# Patient Record
Sex: FEMALE | Race: OTHER | Employment: FULL TIME | URBAN - METROPOLITAN AREA
[De-identification: names, ages, dates, MRNs, and addresses within clinical notes are randomized per-mention and may not be internally consistent; named-entity substitution may affect disease eponyms.]

---

## 2019-12-18 LAB
CHLAMYDIA, EXTERNAL: NEGATIVE
HBSAG, EXTERNAL: NEGATIVE
HIV, EXTERNAL: NON REACTIVE
N. GONORRHEA, EXTERNAL: NEGATIVE
RUBELLA, EXTERNAL: NORMAL
T. PALLIDUM, EXTERNAL: NON REACTIVE
TYPE, ABO & RH, EXTERNAL: NORMAL

## 2020-05-04 LAB
ANTIBODY SCREEN, EXTERNAL: NEGATIVE
T. PALLIDUM, EXTERNAL: NON REACTIVE

## 2020-06-29 LAB — GRBS, EXTERNAL: NEGATIVE

## 2020-07-14 ENCOUNTER — HOSPITAL ENCOUNTER (OUTPATIENT)
Dept: PREADMISSION TESTING | Age: 25
Discharge: HOME | End: 2020-07-14
Payer: COMMERCIAL

## 2020-07-14 DIAGNOSIS — U07.1 COVID-19: ICD-10-CM

## 2020-07-14 PROCEDURE — 87635 SARS-COV-2 COVID-19 AMP PRB: CPT

## 2020-07-15 LAB — SARS-COV-2, COV2NT: NOT DETECTED

## 2020-07-16 ENCOUNTER — HOSPITAL ENCOUNTER (INPATIENT)
Age: 25
LOS: 5 days | Discharge: HOME | End: 2020-07-21
Attending: OBSTETRICS & GYNECOLOGY | Admitting: OBSTETRICS & GYNECOLOGY
Payer: COMMERCIAL

## 2020-07-16 DIAGNOSIS — Z98.891 S/P CESAREAN SECTION: Primary | ICD-10-CM

## 2020-07-16 PROBLEM — O36.5990 IUGR (INTRAUTERINE GROWTH RESTRICTION) AFFECTING CARE OF MOTHER: Status: ACTIVE | Noted: 2020-07-16

## 2020-07-16 LAB
ALBUMIN SERPL-MCNC: 2.6 G/DL (ref 3.5–5)
ALBUMIN/GLOB SERPL: 0.7 {RATIO} (ref 1.1–2.2)
ALP SERPL-CCNC: 136 U/L (ref 45–117)
ALT SERPL-CCNC: 21 U/L (ref 12–78)
ANION GAP SERPL CALC-SCNC: 10 MMOL/L (ref 5–15)
AST SERPL-CCNC: 22 U/L (ref 15–37)
BILIRUB SERPL-MCNC: 0.3 MG/DL (ref 0.2–1)
BUN SERPL-MCNC: 13 MG/DL (ref 6–20)
BUN/CREAT SERPL: 19 (ref 12–20)
CALCIUM SERPL-MCNC: 8.3 MG/DL (ref 8.5–10.1)
CHLORIDE SERPL-SCNC: 106 MMOL/L (ref 97–108)
CO2 SERPL-SCNC: 20 MMOL/L (ref 21–32)
CREAT SERPL-MCNC: 0.67 MG/DL (ref 0.55–1.02)
CREAT UR-MCNC: 144 MG/DL
ERYTHROCYTE [DISTWIDTH] IN BLOOD BY AUTOMATED COUNT: 13.4 % (ref 11.5–14.5)
GLOBULIN SER CALC-MCNC: 3.6 G/DL (ref 2–4)
GLUCOSE SERPL-MCNC: 142 MG/DL (ref 65–100)
HCT VFR BLD AUTO: 33.6 % (ref 35–47)
HGB BLD-MCNC: 11.2 G/DL (ref 11.5–16)
MCH RBC QN AUTO: 29.3 PG (ref 26–34)
MCHC RBC AUTO-ENTMCNC: 33.3 G/DL (ref 30–36.5)
MCV RBC AUTO: 88 FL (ref 80–99)
NRBC # BLD: 0 K/UL (ref 0–0.01)
NRBC BLD-RTO: 0 PER 100 WBC
PLATELET # BLD AUTO: 315 K/UL (ref 150–400)
PMV BLD AUTO: 10.1 FL (ref 8.9–12.9)
POTASSIUM SERPL-SCNC: 3.6 MMOL/L (ref 3.5–5.1)
PROT SERPL-MCNC: 6.2 G/DL (ref 6.4–8.2)
PROT UR-MCNC: 19 MG/DL (ref 0–11.9)
PROT/CREAT UR-RTO: 0.1
RBC # BLD AUTO: 3.82 M/UL (ref 3.8–5.2)
SODIUM SERPL-SCNC: 136 MMOL/L (ref 136–145)
WBC # BLD AUTO: 11.9 K/UL (ref 3.6–11)

## 2020-07-16 PROCEDURE — 77030028565 HC CATH CERV RIPNG BLN COOK -B

## 2020-07-16 PROCEDURE — 85027 COMPLETE CBC AUTOMATED: CPT

## 2020-07-16 PROCEDURE — 84156 ASSAY OF PROTEIN URINE: CPT

## 2020-07-16 PROCEDURE — 65270000029 HC RM PRIVATE

## 2020-07-16 PROCEDURE — 80053 COMPREHEN METABOLIC PANEL: CPT

## 2020-07-16 PROCEDURE — 4A1HXCZ MONITORING OF PRODUCTS OF CONCEPTION, CARDIAC RATE, EXTERNAL APPROACH: ICD-10-PCS | Performed by: OBSTETRICS & GYNECOLOGY

## 2020-07-16 PROCEDURE — 36415 COLL VENOUS BLD VENIPUNCTURE: CPT

## 2020-07-16 RX ORDER — SODIUM CHLORIDE 0.9 % (FLUSH) 0.9 %
5-40 SYRINGE (ML) INJECTION EVERY 8 HOURS
Status: DISCONTINUED | OUTPATIENT
Start: 2020-07-16 | End: 2020-07-21 | Stop reason: HOSPADM

## 2020-07-16 RX ORDER — OXYTOCIN/0.9 % SODIUM CHLORIDE 30/500 ML
0-25 PLASTIC BAG, INJECTION (ML) INTRAVENOUS
Status: DISCONTINUED | OUTPATIENT
Start: 2020-07-17 | End: 2020-07-18 | Stop reason: HOSPADM

## 2020-07-16 RX ORDER — ZOLPIDEM TARTRATE 5 MG/1
5 TABLET ORAL
Status: DISCONTINUED | OUTPATIENT
Start: 2020-07-16 | End: 2020-07-21 | Stop reason: HOSPADM

## 2020-07-16 RX ORDER — CYANOCOBALAMIN (VITAMIN B-12) 1000 MCG
1 TABLET, EXTENDED RELEASE ORAL DAILY
COMMUNITY

## 2020-07-16 RX ORDER — ONDANSETRON 2 MG/ML
4 INJECTION INTRAMUSCULAR; INTRAVENOUS
Status: DISCONTINUED | OUTPATIENT
Start: 2020-07-16 | End: 2020-07-18 | Stop reason: HOSPADM

## 2020-07-16 RX ORDER — SODIUM CHLORIDE 0.9 % (FLUSH) 0.9 %
5-40 SYRINGE (ML) INJECTION AS NEEDED
Status: DISCONTINUED | OUTPATIENT
Start: 2020-07-16 | End: 2020-07-21 | Stop reason: HOSPADM

## 2020-07-16 RX ORDER — SODIUM CHLORIDE 0.9 % (FLUSH) 0.9 %
SYRINGE (ML) INJECTION
Status: COMPLETED
Start: 2020-07-16 | End: 2020-07-16

## 2020-07-16 RX ORDER — FENTANYL CITRATE 50 UG/ML
50 INJECTION, SOLUTION INTRAMUSCULAR; INTRAVENOUS
Status: COMPLETED | OUTPATIENT
Start: 2020-07-16 | End: 2020-07-18

## 2020-07-16 RX ORDER — ASPIRIN 81 MG/1
81 TABLET ORAL DAILY
COMMUNITY
End: 2020-07-21

## 2020-07-16 RX ORDER — TERBUTALINE SULFATE 1 MG/ML
0.25 INJECTION SUBCUTANEOUS AS NEEDED
Status: DISCONTINUED | OUTPATIENT
Start: 2020-07-16 | End: 2020-07-18 | Stop reason: HOSPADM

## 2020-07-16 RX ADMIN — Medication 10 ML: at 16:51

## 2020-07-16 NOTE — PROGRESS NOTES
A  admitted to [facility] 7 for induction under the services of Dr. Mario Cummins, Dr. Vikas Dolan covering. Pt states she is being induced because the baby is small (IUGR). Denies other problems during the pregnancy. 1651:  IV started in L hand, labs drawn with IV start. 1707:  Dr. Vikas Dolan at bedside discussing plan of care. 1712: Attempted placement of Cook catheter per Dr. Vikas Dolan. 1728:  Cook Catheter placed per Dr. Vikas Dolan, 60 ml NS in uterine balloon and 60 ml NS in vaginal balloon  1844:  Pt sitting up eating dinner, difficulty keeping continuous FHR tracing. 1945:  Bedside and Verbal shift change report given to LESTER Stokes RN (oncoming nurse) by VINCE Mulligan RN (offgoing nurse). Report included the following information SBAR, Kardex, Intake/Output, MAR and Recent Results.

## 2020-07-16 NOTE — H&P
Print      Patient  Name Skylar Storey (75BS, F) ID# [id number] Appt. Date/Time 2020 09:45AM    1995 Service Dept. [facility] [facility]   Provider Keila Cardona MD   Insurance Med Primary: [facility] [employer] [facility] [address] # : [id number]  Policy/Group # : [id number]  Employer Name : [employer]   Prescription: [employer] - Member is eligible. details   Chief Complaint  OB visit  Patient's Care Team  OB/GYN: Justine García [address], [facility], [address], Ph [phone], Fax [phone] NPI: [id number]  Primary Care Provider: Kleber Campbell (NO PREFERRED PCP): [phone]  Patient's Pharmacies  [facility] #[id number]): [address], [place] [address], Ph [phone], Fax [phone]  2020 09:57 am  Wt: 241 lbs (109.32 kg) BP: 136/90     Allergies  Allergies not reviewed (last reviewed 2020)     NKDA   Medications  Reviewed Medications     Asprin Ec Low Dose 81 mg tablet,delayed release  Take 1 tablet(s) every day by oral route. 02/10/20   entered Tessy Stokes NP   Prenatal 20   entered Carmel Bush Stevens   Slow Fe 20   entered Viktoria Watkins NP   Vaccines  Vaccine Type Date Amt. Route Site [address] Lot # Mfr. Exp.   Date Date  on VIS VIS  Given Vaccinator   Diphtheria, Tetanus, Pertussis   Tdap 20 0.5 mL Intramuscular Deltoid, Left  [id number] [employer]  20 Sonia Cadet                                                     HPV   HPV, quadrivalent 17                                                               Problems  Reviewed Problems     Obesity - Onset: 2017 - Entered By: Trudy Headley LPN - Team 3 SHPSigned By: Kb Javier CNM Description: Obesity (BMI > 29.99) code: 278.00   Hidradenitis suppurativa - Onset: 2019  Family History  Family History not reviewed (last reviewed 2020)    Maternal Grandmother - Diabetes mellitus   Paternal Grandfather - Family history of cancer  - unknown type   Maternal Grandfather - Diabetes mellitus   Social History  Social History not reviewed (last reviewed 05/04/2020)  OB/GYN Social History  Chewing tobacco: none  Tobacco Smoking Status: Former smoker  E-cigarette/Vape Status: Never used electronic cigarettes  Marital status: Single  Sexual orientation?: Straight or heterosexual  Number of children: 0  Are you working: Yes  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)  On average, how many days per week do you engage in moderate to strenuous EXERCISE (like walking fast, running, jogging, dancing, swimming, biking, or other activities that cause a light or heavy sweat)?: 0  On those days, how many minutes, on average, do you engage in EXERCISE at this level?: 0  How often do you have a DRINK containing ALCOHOL?: Never  Have you recently (within the last 12 weeks, or during a current pregnancy) traveled to or lived in a Zika-affected area?: N  Do you have symptoms associated with Zika virus (fever, rash, joint pain, or conjunctivitis)?: N  Are you currently sexually active with anyone who has traveled (within the last 12 weeks) to a Zika-affected area?: N  Are you planning to conceive with someone who has traveled (within the last 12 weeks) to a Zika-affected area?: N  Have you had sexual relations with anyone who has been positively diagnosed with Zika virus within the last 6 months?: N  Is blood transfusion acceptable in an emergency?: Y  Surgical History  Surgical History not reviewed (last reviewed 05/04/2020)    [facility] - 2017 - right ankle  GYN History  GYN History not reviewed (last reviewed 12/18/2019)  Date of LMP: 10/25/2019 (Notes: Pregnant). Menses Monthly: N. Date of Last Pap Smear: 11/17/2017 (Notes: NIL). Abnormal Pap: N.  HPV Vaccine: Y (Notes: Completed). Sexual Orientation: Heterosexual.  Sexually Active?: Y.  STIs/STDs: N.  Current Birth Control Method: Pregnant. Endometriosis: N.   Fibroids: N.  Infertility: N. Ovarian Cyst: N. PCOS: N.  Obstetric History  Obstetric History not reviewed (last reviewed 12/18/2019)    TOTAL FULL PRE AB. I AB. S ECTOPICS MULTIPLE LIVING   1          Pregnancy Problem List   Anemia - hgb 11   Primigravida   Small for gestational age fetus - FS 12%ile Repeat growth 24w__16th% 34wUS __11%ile (AC 12%ile, normal dopplers) Repeat US w/MFM 37w _8%ile--->IOL at 37-38w for IUGR_   Maternal obesity complicating pregnancy, childbirth and the puerperium, antepartum - 81mg Asprin ~12wks:_taking    gender reveal 03/21- it's a GIRL! cervical ripening 7/16/20 @ 4pm @ [address] [place] [address] w/ IOL 7/17/20 7/14/20 PNR faxed - [facility]  Genetic Screening and Infection History  Medications (including Supplements, Vitamins, Herbs, OTC Drugs), Illicit/Recreational Drugs, Alcohol: Y, Alcohol and marijuana use prior to pregnancy knowledge. .  Patient's Age Will Be 28 Years Or Older At Estimated Date of Delivery: N  2824: [facility] ([facility] [place], [place], Or  Background): MCV < 80: N  Neural Tube Defect (Meningomyelocele, Spina Bifida, Or Anencephaly): N  746: Congenital Heart Defect: N  7580: Down Syndrome: N  Edmundo-Sachs (eg, [facility], [facility]): N  Canavan Disease: N  282: Sickle Cell Disease Or Trait (): N  Hemophilia Or Other Blood Disorders: N  359: Muscular Dystrophy: N  2770: Cystic Fibrosis: N  3334: Marito's Chorea: N  319: Mental Retardation/Autism: N  If Yes, Was Person Tested For Fragile X?: N  Other Inherited Genetic Or Chromosomal Disorder: N  Maternal Metabolic Disorder (eg, Type 1 Diabetes, PKU): N  Patient Or [de-identified] Father Had A Child With Birth Defects Not Listed Above: N  Recurrent Pregnancy Loss, Or A Stillbirth: N  If Yes, Agent(s) And Strength/Dosage: N  Any Other Genetic History: N  Live With Someone With TB Or Exposed To TB: N  Patient Or Partner Has History Of Genital Herpes: N  Rash Or Viral Illness Since Last Menstrual Period: N  History Of STD, Gonorrhea, Chlamydia, HPV, Syphilis: N  Other Infection History: N  History of HIV: N  History of Hepatitis: N  Prior GBS-infected child: N  Prenatal Flowsheet  Fundus Pres FHR FM PLS Cervix Exam BP Wt Edema Glucose Protein Leukocytes Nitrite Ketones Blood   12/18/2019   8 wks 4 days                                          12/18/2019   8 wks 4 days   [email or web address]                         175    118/72 sitting 177 lbs none none trace       Comments: Viable IUP, Prenatal profile sent. Discussed PN screening/ testing options, diet, medications and full PN packet reviewed. Discussed \"Deck model\" and routine visit schedule. General Rec's and precautions reviewed. All q's answered. 01/16/2020   12 wks 5 days   [email or web address]                         155    122/80 182 lbs none none trace       Comments: Doing well. Minimal nausea and vomiting. Declined genetic screening tests. No V/B or LOF. No pelvic pain. Reviewed ASA during pregnancy, patient to do research. All questions and concerns addressed. RTO x 4 weeks. Bleeding and pain precuations discussed. 02/10/2020   16 wks 2 days   [email or web address]                         153    132/84 186 lbs none none neg       Comments: Doing well, no complaints. General recommendations and precautions reviewed. All questions answered. Tetra today. 03/12/2020   20 wks 5 days   [email or web address]                         146 No   118/80 201 lbs none none neg       Comments: Doing well. Ultrasound today EFW 12%ile and incomplete views obtained. Gender reveal party next weekend. No VB, VD, dysuria, LOF, contractions. Feeling flutters. Occasional round ligament pain with movement. Wants to delivery at [facility], wants to see a provider that will be delivering there. RTO x 2 weeks for additional views, growth in 4 weeks. 04/07/2020   24 wks 3 days   [email or web address]                         145 Yes   124/82 216 lbs none none neg       Comments: Pt denies Headaches, N/V, Abnormal discharge, bleeding and cramping.  Discussed weight gain and rec's made. US for growth next week. Offered today but needed to return to work. 04/13/2020   25 wks 2 days   [email or web address]                       25 cm  151 Yes   128/88 218 lbs none none neg       Comments: Rm 15. US prior. EFW 16th%ile. Normal ANEL. C/o occasional nausea. Patient denies any headaches, vomiting, vaginal bleeding or abnormal discharge. +GFM. 28 wk labs, tdap next OV. GS in 4-5 weeks. Precautions reviewed. 05/04/2020   28 wks 2 days   [email or web address]                       28 cm  141 Yes   122/84 227 lbs none none neg       Comments: Doing well however fatigued. Glucola and TdAP today. EFW 16th% last month 4/13. Will f/u growth in 2w. Denies any n/v, headaches, cramping, abnormal discharge, or vaginal bleeding. Watch maternal wt gain. 50lb since. BP stable. No proteinuria. Reports red bumps to breast. Exam c/w benign appearing cherry angiomas. Reassured. F/u as scheduled   05/20/2020   30 wks 4 days   [email or web address]                       30 cm  131 Yes   124/82 230 lbs none none neg none   neg   Comments: EFW 24%, prev 16%, anel 13.6. C/o back pain at end of the day, MSK. May try heat and tylenol. No n/v, headaches, cramping/ctx, abnormal discharge, or vb/lof. + fm.   06/03/2020   32 wks 4 days   [email or web address]                       32 cm  141 Yes   118/72 231 lbs none none neg       Comments: Rm 14: notes red bumps/dots on face, random occurrences. Does not have any new food or hygiene product exposure. Notes no flushing, itchiness etc and self resolves within a day. Possible heat rash? active FM, no swelling. Precautions reviewed. 06/15/2020   34 wks 2 days   [email or web address]                         142 Yes   116/78 236 lbs none none trace       Comments: Decreased growth from 24%ile to 11%ile. Normal BPP, ANEL and dopplers. Having GERD symptoms, discussed avoiding spicy and acidic foods, small meals throughout day and to stay upright for 30 minutes after eating.  Pt denies Headaches, N/V, Abnormal discharge, bleeding and cramping. Per MFM, growth follow up in 3 weeks, ELSI visit 2 weeks. PTL and FKC precautions discussed. All questions and concerns addressed. 06/29/2020   36 wks 2 days   [email or web address]                        Vertex 142 Yes  0cm / 0% / -4 118/80 243 lbs none none neg       Comments: Doing well! No LOF, VB, VD, dysuria, contractions. Good FM. GBS today. Cephalic on vscan. 0cm dilated, long cervix. RTO x 1 week growth scan w/ BPP w/ MFM for SGA. Plan pending repeat US. PTL precautions and FKCs discussed. Discussed COVID screening 38wks. 07/10/2020   37 wks 6 days   [email or web address]                                        Comments: VIRTUAL VISIT: Doing well! No contractions, LOF, VB, VD, dysuria. Good FM. Discussed IOL 7/17 with cervical ripening the night prior. Discussed COVID19 testing at 38wks. Reviewed IUGR, growth 8%ile, BPP 8/8 and normal dopplers. No scale at home or BP cuff at home. RTO x 1 week with Dr. Maria De Jesus [place]   07/16/2020   38 wks 5 days                           134               07/16/2020   38 wks 5 days   [email or web address]                         134 Yes  0cm / 50% / -3 136/90 241 lbs 1+ none 1+       Comments: US today. BPP 8/8, ANEL wnl. Good FM. Denies headaches, nausea, abnormal discharge, bleeding, cramping. Ripening tonight with IOL tomorrow. FT/50/-2. Precautions reviewed. OB Lab Results    Result Value Ref.  Range Date Collected Date Reviewed Reviewed By Note   Initial Labs             Blood Type O  12/18/2019 12/20/2019 [email or web address]        D (Rh) Type Positive  12/18/2019 12/20/2019 [email or web address]        Antibody Screen Negative NEGATIVE 12/18/2019 12/20/2019 [email or web address]        Antibody Screen Negative NEGATIVE 05/04/2020 05/06/2020 [email or web address]        HCT - Initial 35.9 % 34.0-46.6 12/18/2019 12/20/2019 [email or web address]        HGB - Initial 12.3 g/dL 11.1-15.9 12/18/2019 12/20/2019 [email or web address]        MCV - Initial 88 fL 79-97 12/18/2019 12/20/2019 [email or web address]        PLT - Initial 317 x10E3/uL 150-450 12/18/2019 12/20/2019 [email or web address]        VDRL - Initial   12/18/2019 12/20/2019 [email or web address]        Urine Culture/Screen Comment  12/18/2019 12/20/2019 [email or web address]        HBsAg Negative NEGATIVE 12/18/2019 12/20/2019 [email or web address]        HIV Counseling/Testing Non Reactive NON REACTIVE 12/18/2019 12/20/2019 [email or web address]        Chlamydia - Initial Negative NEGATIVE 12/18/2019 12/24/2019 [email or web address]        Gonorrhea - Initial Negative NEGATIVE 12/18/2019 12/24/2019 [email or web address]        Varicella             Rubella 1.28 index IMMUNE >0.99 12/18/2019 12/20/2019 [email or web address]    Optional Labs             HGB Electrophoresis             PPD/Quanta             Pap Test             Cystic Fibrosis             HPV             Edmundo-Sachs             Familial Dysautonomia             Genetic Screening Tests             NST             TSH             Drug screen             HCV Ab             HCV RNA             Urinalysis             Rhogam Injection         8-20 Week Labs             Ultrasound - Initial             1st Trimester Aneuploidy Risk Assessment             MSAFP/Multiple Markers   02/10/2020 02/14/2020 [email or web address]        2nd Trimester Serum Screening             Amnio/CVS             Karyotype             Amniotic Fluid (AFP)         24-28 Week Labs             HCT - 24-28 Weeks 32.3 % 34.0-46.6 05/04/2020 05/06/2020 [email or web address]        HGB - 24-28 Weeks 11.0 g/dL 11.1-15.9 05/04/2020 05/06/2020 [email or web address]        MCV - 24-28 Weeks             PLT - 24-28 Weeks 270 x10E3/uL 150-450 05/04/2020 05/06/2020 [email or web address]        Diabetes Screen 105 mg/dL  05/04/2020 05/06/2020 [email or web address]        GTT (If Screen Abnormal)             D (Rh) Antibody Screen         32-36 Week Labs             HCT - 32-36 Weeks             HGB - 32-36 Weeks             MCV - 32-36 Weeks             PLT - 32-36 Weeks             Ultrasound - 32-36 Weeks             HIV (When Indicated)             VDRL - 32-36 Weeks   05/04/2020 05/06/2020 [id number]        Gonorrhea - 32-36 Weeks             Chlamydia - 32-36 Weeks Depression screening (when indicated)         35-37 Week Labs             Group B Strep Negative NEGATIVE 2020 [email or web address]        Resistance testing if penicillin allergic         Other Labs             Other         Past Medical History  Past Medical History not reviewed (last reviewed 2020)  Asthma: Y  HPI  Feeling well, excited for delivery! No ctx, vb, lof. +FM. ROS  Additionally reports: Except as noted in the HPI, the review of systems is negative for General and . Physical Exam  Patient is a 81-year-old female. Constitutional: General Appearance: well developed and nourished and pleasant. Level of Distress: no acute distress. Ambulation: ambulating normally. Head: Head: normocephalic. Eyes: Extraocular Movements extraocular movements intact. Ears, Nose, Mouth, Throat: Ears normal hearing. Nose: no external nose lesions. Neck: Appearance supple, trachea midline, and no neck masses. Thyroid: no enlargement or nodules and non-tender. Lungs / Chest: Respiratory effort: unlabored. Abdomen: Inspection and Palpation: Gravid, nontender. Female : Cervix: FT/50/-3, posterior. Uterus: gravid, NT. Lymphatics: Inguinal no inguinal lymphadenopathy. Skin: General Skin no rash or suspicious lesions. Neurologic: Gait and Station: normal gait. Sensation: grossly intact. Motor: grossly intact. Mental Status Exam: Orientation oriented to person, place, and time. Assessment / Plan  1. Routine  care  Z34.03: Encounter for supervision of normal first pregnancy, third trimester    2. Gestation period, 38 weeks  Z3A.38: 38 weeks gestation of pregnancy    3. Fetal growth restriction -  -EFW 8th%ile  -BPP   -IOL tonight  -Will need ripening with marquez  -GBS neg  O35. 8XX9: Maternal care for other (suspected) fetal abnormality and damage, other fetus    4.  Elevated blood-pressure reading without diagnosis of hypertension -  -First mild range pressure in office today, no sx  -Consider preE labs at admission if still mild range  R03.0: Elevated blood-pressure reading, without diagnosis of hypertension      Return to Office   Guille Schulte MD for Return OB at [facility] on 07/17/2020 at 06:00 AM   Juanita العراقي MD for Return OB at [facility] [facility] on 07/23/2020 at 09:45 AM  Encounter Sign-Off  Encounter signed-off by Juanita العراقي MD, 07/16/2020.   Encounter performed and documented by Juanita العراقي MD  Encounter reviewed & signed by Juanita العراقي MD on 07/16/2020 at 10:13am    There is not enough information to calculate an E&M code      Audit history

## 2020-07-16 NOTE — PROGRESS NOTES
1945-Bedside and Verbal shift change report given to LESTER Stokes RN  (oncoming nurse) by Elzbieta Jones. Nahed You RN  (offgoing nurse). Report included the following information SBAR, Kardex, MAR, Accordion and Recent Results. Client resting quietly in bed no distress noted. Denies feeling any contractions or feeling any discomfort. 2120-Client up to bathroom, urine sample obtained. Client transferred to a regular hospital bed for rest. Informed client about her option for a sleeping pill for the night. Client states she will let me know if she wants it later. 2345-Client sleeping, no distress noted. 0308-Client up to bathroom, states that she is resting well and have not been having any painful contractions.

## 2020-07-16 NOTE — PROGRESS NOTES
Speculum used to place cervical balloon. Inflated under direct visualization to confirm placement.   60/60    BP labs still pending

## 2020-07-17 ENCOUNTER — ANESTHESIA (OUTPATIENT)
Dept: LABOR AND DELIVERY | Age: 25
End: 2020-07-17
Payer: COMMERCIAL

## 2020-07-17 ENCOUNTER — ANESTHESIA EVENT (OUTPATIENT)
Dept: LABOR AND DELIVERY | Age: 25
End: 2020-07-17
Payer: COMMERCIAL

## 2020-07-17 PROCEDURE — 74011250636 HC RX REV CODE- 250/636: Performed by: OBSTETRICS & GYNECOLOGY

## 2020-07-17 PROCEDURE — 65270000029 HC RM PRIVATE

## 2020-07-17 PROCEDURE — 74011250636 HC RX REV CODE- 250/636

## 2020-07-17 PROCEDURE — 74011250636 HC RX REV CODE- 250/636: Performed by: ADVANCED PRACTICE MIDWIFE

## 2020-07-17 PROCEDURE — 3E033VJ INTRODUCTION OF OTHER HORMONE INTO PERIPHERAL VEIN, PERCUTANEOUS APPROACH: ICD-10-PCS | Performed by: OBSTETRICS & GYNECOLOGY

## 2020-07-17 RX ORDER — FENTANYL CITRATE 50 UG/ML
100 INJECTION, SOLUTION INTRAMUSCULAR; INTRAVENOUS ONCE
Status: COMPLETED | OUTPATIENT
Start: 2020-07-18 | End: 2020-07-18

## 2020-07-17 RX ORDER — FENTANYL/BUPIVACAINE/NS/PF 2-1250MCG
1-16 PREFILLED PUMP RESERVOIR EPIDURAL CONTINUOUS
Status: DISCONTINUED | OUTPATIENT
Start: 2020-07-17 | End: 2020-07-21 | Stop reason: HOSPADM

## 2020-07-17 RX ORDER — NALOXONE HYDROCHLORIDE 0.4 MG/ML
0.4 INJECTION, SOLUTION INTRAMUSCULAR; INTRAVENOUS; SUBCUTANEOUS AS NEEDED
Status: DISCONTINUED | OUTPATIENT
Start: 2020-07-17 | End: 2020-07-18 | Stop reason: HOSPADM

## 2020-07-17 RX ORDER — OXYTOCIN/0.9 % SODIUM CHLORIDE 30/500 ML
PLASTIC BAG, INJECTION (ML) INTRAVENOUS
Status: COMPLETED
Start: 2020-07-17 | End: 2020-07-17

## 2020-07-17 RX ORDER — FENTANYL CITRATE 50 UG/ML
100 INJECTION, SOLUTION INTRAMUSCULAR; INTRAVENOUS ONCE
Status: ACTIVE | OUTPATIENT
Start: 2020-07-17 | End: 2020-07-17

## 2020-07-17 RX ORDER — OXYTOCIN 10 [USP'U]/ML
INJECTION, SOLUTION INTRAMUSCULAR; INTRAVENOUS
Status: DISCONTINUED
Start: 2020-07-17 | End: 2020-07-17 | Stop reason: WASHOUT

## 2020-07-17 RX ORDER — SODIUM CHLORIDE, SODIUM LACTATE, POTASSIUM CHLORIDE, CALCIUM CHLORIDE 600; 310; 30; 20 MG/100ML; MG/100ML; MG/100ML; MG/100ML
125 INJECTION, SOLUTION INTRAVENOUS CONTINUOUS
Status: DISCONTINUED | OUTPATIENT
Start: 2020-07-17 | End: 2020-07-21 | Stop reason: HOSPADM

## 2020-07-17 RX ORDER — LIDOCAINE HYDROCHLORIDE 10 MG/ML
INJECTION INFILTRATION; PERINEURAL
Status: DISCONTINUED
Start: 2020-07-17 | End: 2020-07-17 | Stop reason: WASHOUT

## 2020-07-17 RX ORDER — EPHEDRINE SULFATE/0.9% NACL/PF 50 MG/5 ML
10 SYRINGE (ML) INTRAVENOUS
Status: COMPLETED | OUTPATIENT
Start: 2020-07-17 | End: 2020-07-18

## 2020-07-17 RX ORDER — BUPIVACAINE HYDROCHLORIDE 5 MG/ML
30 INJECTION, SOLUTION EPIDURAL; INTRACAUDAL AS NEEDED
Status: DISCONTINUED | OUTPATIENT
Start: 2020-07-17 | End: 2020-07-18 | Stop reason: HOSPADM

## 2020-07-17 RX ORDER — LIDOCAINE HYDROCHLORIDE AND EPINEPHRINE 15; 5 MG/ML; UG/ML
4.5 INJECTION, SOLUTION EPIDURAL AS NEEDED
Status: DISCONTINUED | OUTPATIENT
Start: 2020-07-17 | End: 2020-07-18 | Stop reason: HOSPADM

## 2020-07-17 RX ORDER — BUTORPHANOL TARTRATE 1 MG/ML
2 INJECTION INTRAMUSCULAR; INTRAVENOUS
Status: DISCONTINUED | OUTPATIENT
Start: 2020-07-17 | End: 2020-07-21 | Stop reason: HOSPADM

## 2020-07-17 RX ORDER — FENTANYL/BUPIVACAINE/NS/PF 2-1250MCG
1-16 PREFILLED PUMP RESERVOIR EPIDURAL CONTINUOUS
Status: DISCONTINUED | OUTPATIENT
Start: 2020-07-18 | End: 2020-07-21 | Stop reason: HOSPADM

## 2020-07-17 RX ADMIN — OXYTOCIN 1 MILLI-UNITS/MIN: 10 INJECTION, SOLUTION INTRAMUSCULAR; INTRAVENOUS at 04:03

## 2020-07-17 RX ADMIN — SODIUM CHLORIDE, SODIUM LACTATE, POTASSIUM CHLORIDE, AND CALCIUM CHLORIDE 125 ML/HR: 600; 310; 30; 20 INJECTION, SOLUTION INTRAVENOUS at 03:30

## 2020-07-17 RX ADMIN — SODIUM CHLORIDE, SODIUM LACTATE, POTASSIUM CHLORIDE, AND CALCIUM CHLORIDE 125 ML/HR: 600; 310; 30; 20 INJECTION, SOLUTION INTRAVENOUS at 13:50

## 2020-07-17 RX ADMIN — Medication 10 ML: at 03:30

## 2020-07-17 RX ADMIN — OXYTOCIN 2 MILLI-UNITS/MIN: 10 INJECTION, SOLUTION INTRAMUSCULAR; INTRAVENOUS at 20:20

## 2020-07-17 NOTE — PROGRESS NOTES
[facility] Labor Progress Note     Patient: Teressa Glover MRN: [id number]  SSN: [id number]    YOB: 1995  Age: 26 y.o. Sex: female      Care assumed at     Subjective:   Patient resting in bed. Feeling well, no ctx.  is at bedside providing support. Objective:   Blood pressure 134/88, pulse 77, temperature 98.9 °F (37.2 °C), resp. rate 18, height 5' 3\" (1.6 m), weight 109.3 kg (241 lb), not currently breastfeeding. Patient Vitals for the past 4 hrs:    Mode Fetal Heart Rate Fetal Activity Variability Decelerations Accelerations RN Reviewed Strip?   20 195 External  Present 6-25 BPM None Yes Yes   20 1900 External 135 Present 6-25 BPM None Yes Yes   20 1830 External 140 Present 6-25 BPM None Yes Yes   20 1800 External 140 Present 6-25 BPM None Yes Yes   20 1730 External 145 Present 6-25 BPM None Yes Yes   20 1700 External 145 Present 6-25 BPM None Yes Yes   20 1640 External 140 Present 6-25 BPM None Yes Yes   20 1629 External 140          No uterine contractions, resting tone soft, Sterile Vaginal Exam: deferred, membranes intact    Cook catheter in place, 60ml/60ml    Patient Vitals for the past 4 hrs:   Temp Pulse Resp BP   20  77  134/88   20 1823  87 18 142/88   20 1704  82 18 138/83   20 1631 98.9 °F (37.2 °C) (!) 113 18 (!) 131/92       Assessment:     38w5d  Category 1 fetal heart rate tracing   IOL for IUGR    Plan:   Continue current orders/management as outlined by Dr. Paula Delgado Luan Fam and Pitocin at [address])  Therapeutic rest tonight  Maternal and fetal monitoring per protocol  Anticipate KATIE Melvin CNM

## 2020-07-17 NOTE — PROGRESS NOTES
[facility] Labor Progress Note     Patient: Devaughn Hackett MRN: [id number]  SSN: [id number]    YOB: 1995  Age: 26 y.o. Sex: female        Subjective:   Patient sleeping. Not feeling any contractions. Objective:   Blood pressure 130/72, pulse 77, temperature 98.1 °F (36.7 °C), resp. rate 16, height 5' 3\" (1.6 m), weight 109.3 kg (241 lb), not currently breastfeeding. Patient Vitals for the past 4 hrs:    Mode Fetal Heart Rate Fetal Activity Variability Decelerations Accelerations RN Reviewed Strip?   20 0500 External 130  6-25 BPM None Yes Yes   20 0445 External 130        20 0430 External 130  6-25 BPM None Yes Yes   20 0415 External 130        20 0403 External 120  6-25 BPM None Yes Yes   20 0317 External 130 Present 6-25 BPM None Yes Yes   20 0200 External 130 Present 6-25 BPM None Yes Yes     No contractions,     Sterile Vaginal Exam: deferred, membranes intact     Cook snuggly in place    Patient Vitals for the past 4 hrs:   Temp Pulse Resp BP   20 0317 98.1 °F (36.7 °C) 77 16 130/72       Pitocin at 2 mu/min    Assessment:     38w6d  Category 1 fetal heart rate tracing   Labor for IUGR    Plan:   Gentle tractions applied to cook, firmly in place, no give  Continue to titrate pitocin to adequate contractions pattern  Maternal and fetal monitoring per protocol  Anticipate     Amira Padgett CNM

## 2020-07-17 NOTE — PROGRESS NOTES
1535. Bedside and Verbal shift change report given to w. [address] [facility] Tabitha rn (oncoming nurse) by Remedios Xiao rn (offgoing nurse). Report included the following information SBAR, Intake/Output, MAR and Recent Results. 1739. IV pitocin turned off    [id number]. Dr. Maulik Wiseman at bedside to discuss plan of care. sve and no change. Pitocin break, pt may eat dinner and shower. Will restart pitocin overnight per dr. Rajesh Chaudhary. [phone]. Dr. Fozia Hanson at bedside to discuss plan of care. Orders received to restart IV pitocin.    2020. IV pitocin restarted at 2ml/hour. 2330. Pt called out SROM for clear moderate fluid. 2335. Bedside and Verbal shift change report given to faiza Stokes rn (oncoming nurse) by sophie Veras rn (offgoing nurse). Report included the following information SBAR, Intake/Output, MAR and Recent Results.

## 2020-07-17 NOTE — PROGRESS NOTES
LATE ADD NOTE  Patient seen around 1330 and doing well. Comfortable, feeling mild contractions. No leakage of fluid. Active FM. Visit Vitals  /85   Pulse 70   Temp 99 °F (37.2 °C)   Resp 16   Ht 5' 3\" (1.6 m)   Wt 109.3 kg (241 lb)   Breastfeeding No   BMI 42.69 kg/m²     Patient Vitals for the past 4 hrs:    Mode Fetal Heart Rate Fetal Activity Variability Decelerations Accelerations RN Reviewed Strip?   07/17/20 1600 External 145 Present 6-25 BPM None Yes Yes   07/17/20 1545 External 145 Present    Yes   07/17/20 1530 External 145 Present 6-25 BPM None Yes Yes   07/17/20 1515 External 145     Yes   07/17/20 1500 External 145 Present 6-25 BPM None Yes Yes   07/17/20 1445 External 145     Yes   07/17/20 1430 External 145 Present 6-25 BPM None Yes Yes   07/17/20 1415 External 140     Yes   07/17/20 1400 External 140 Present 6-25 BPM None Yes Yes   07/17/20 1345 External 145     Yes   07/17/20 1330 External 145 Present 6-25 BPM None Yes Yes   07/17/20 1315 External 140     Yes   07/17/20 1300 External 140 Present 6-25 BPM Early Yes Yes   07/17/20 1245 External 140     Yes   07/17/20 1230 External 145 Present 6-25 BPM None Yes Yes     Category 1 Fetal heart tracing    [place]: contractions every 4-6 minutes    Cervix: Cook catheter in vagina and removed, 5VH/78%/-8, cephalic  EFW 6#    G1 38 6/7 IOL for IUGR 8th%ile and GHTN  - continue pitocin titration  - not engaged enough to arom at this time  - BPs mild range no si/sx of Belinda Wolf MD

## 2020-07-17 NOTE — PROGRESS NOTES
Doing well. Active FM. No complaints. Eating in bed. Feeling occasional contraction. Visit Vitals  /84 (BP 1 Location: Right arm, BP Patient Position: At rest;Sitting)   Pulse 72   Temp 98.5 °F (36.9 °C)   Resp 18   Ht 5' 3\" (1.6 m)   Wt 109.3 kg (241 lb)   Breastfeeding No   BMI 42.69 kg/m²     Patient Vitals for the past 4 hrs:    Mode Fetal Heart Rate Fetal Activity Variability Decelerations Accelerations RN Reviewed Strip?   07/17/20 0913 External 135     Yes   07/17/20 0900 External 135 Present 6-25 BPM None Yes Yes   07/17/20 0845 External 135     Yes   07/17/20 0830 External 135 Present 6-25 BPM None Yes Yes   07/17/20 0815 External 135     Yes   07/17/20 0800 External 130 Present 6-25 BPM None Yes Yes   07/17/20 0745 External 135     Yes   07/17/20 0715 External 130        07/17/20 0700 External 130 Present 6-25 BPM None Yes Yes   07/17/20 0645 External 130 Present       07/17/20 0630 External 130 Present 6-25 BPM None Yes Yes   07/17/20 0615 External 130 Present       07/17/20 0600 External 130 Present 6-25 BPM None Yes Yes   07/17/20 0545 External 140 Present         Category 1 fetal heart tracing  [facility]: difficult to  contractions, appears every 4-7  Pitocin 11    Cervix: deferred due to patient eating and comfortable, cook catheter still in place  EFW: 6#  Ext: +1 edema b/l    G1 38 6/7 IOL for IUGR 0va6ktn, GHTN  - cook in place, will recheck cervix and balloon after done eating  - continue pitocin titration  - continue fetal monitoring  - monitor BPs and for si/sx of PreE, labs neg  - GBS neg  - COVID neg    Louis Henry MD

## 2020-07-18 PROCEDURE — 88307 TISSUE EXAM BY PATHOLOGIST: CPT

## 2020-07-18 PROCEDURE — 74011000250 HC RX REV CODE- 250: Performed by: ANESTHESIOLOGY

## 2020-07-18 PROCEDURE — 77030040830 HC CATH URETH FOL MDII -A

## 2020-07-18 PROCEDURE — 36415 COLL VENOUS BLD VENIPUNCTURE: CPT

## 2020-07-18 PROCEDURE — 76010000391 HC C SECN FIRST 1 HR: Performed by: OBSTETRICS & GYNECOLOGY

## 2020-07-18 PROCEDURE — 86900 BLOOD TYPING SEROLOGIC ABO: CPT

## 2020-07-18 PROCEDURE — 76060000078 HC EPIDURAL ANESTHESIA: Performed by: OBSTETRICS & GYNECOLOGY

## 2020-07-18 PROCEDURE — 74011000250 HC RX REV CODE- 250

## 2020-07-18 PROCEDURE — 74011250636 HC RX REV CODE- 250/636: Performed by: OBSTETRICS & GYNECOLOGY

## 2020-07-18 PROCEDURE — 77030019905 HC CATH URETH INTMIT MDII -A

## 2020-07-18 PROCEDURE — 74011250636 HC RX REV CODE- 250/636: Performed by: ANESTHESIOLOGY

## 2020-07-18 PROCEDURE — 74011250636 HC RX REV CODE- 250/636: Performed by: NURSE ANESTHETIST, CERTIFIED REGISTERED

## 2020-07-18 PROCEDURE — 77030012890

## 2020-07-18 PROCEDURE — 77030009413 HC ELECTRD SCALP COVD -A

## 2020-07-18 PROCEDURE — 65410000002 HC RM PRIVATE OB

## 2020-07-18 PROCEDURE — 76010000392 HC C SECN EA ADDL 0.5 HR: Performed by: OBSTETRICS & GYNECOLOGY

## 2020-07-18 PROCEDURE — 74011000250 HC RX REV CODE- 250: Performed by: OBSTETRICS & GYNECOLOGY

## 2020-07-18 PROCEDURE — 74011000250 HC RX REV CODE- 250: Performed by: NURSE ANESTHETIST, CERTIFIED REGISTERED

## 2020-07-18 PROCEDURE — 74011000258 HC RX REV CODE- 258: Performed by: OBSTETRICS & GYNECOLOGY

## 2020-07-18 PROCEDURE — 75410000002 HC LABOR FEE PER 1 HR

## 2020-07-18 PROCEDURE — 77030010848 HC CATH INTUTR PRSS KOLB -B

## 2020-07-18 PROCEDURE — 75410000003 HC RECOV DEL/VAG/CSECN EA 0.5 HR: Performed by: OBSTETRICS & GYNECOLOGY

## 2020-07-18 PROCEDURE — 77030014125 HC TY EPDRL BBMI -B: Performed by: ANESTHESIOLOGY

## 2020-07-18 PROCEDURE — 74011250636 HC RX REV CODE- 250/636: Performed by: ADVANCED PRACTICE MIDWIFE

## 2020-07-18 PROCEDURE — 76060000078 HC EPIDURAL ANESTHESIA

## 2020-07-18 RX ORDER — ONDANSETRON 2 MG/ML
INJECTION INTRAMUSCULAR; INTRAVENOUS AS NEEDED
Status: DISCONTINUED | OUTPATIENT
Start: 2020-07-18 | End: 2020-07-18 | Stop reason: HOSPADM

## 2020-07-18 RX ORDER — KETOROLAC TROMETHAMINE 30 MG/ML
30 INJECTION, SOLUTION INTRAMUSCULAR; INTRAVENOUS
Status: DISPENSED | OUTPATIENT
Start: 2020-07-18 | End: 2020-07-19

## 2020-07-18 RX ORDER — NALOXONE HYDROCHLORIDE 0.4 MG/ML
0.4 INJECTION, SOLUTION INTRAMUSCULAR; INTRAVENOUS; SUBCUTANEOUS AS NEEDED
Status: DISCONTINUED | OUTPATIENT
Start: 2020-07-18 | End: 2020-07-21 | Stop reason: HOSPADM

## 2020-07-18 RX ORDER — ONDANSETRON 4 MG/1
4 TABLET, ORALLY DISINTEGRATING ORAL
Status: DISCONTINUED | OUTPATIENT
Start: 2020-07-18 | End: 2020-07-21 | Stop reason: HOSPADM

## 2020-07-18 RX ORDER — MISOPROSTOL 100 UG/1
TABLET ORAL AS NEEDED
Status: DISCONTINUED | OUTPATIENT
Start: 2020-07-18 | End: 2020-07-18 | Stop reason: HOSPADM

## 2020-07-18 RX ORDER — LIDOCAINE HYDROCHLORIDE AND EPINEPHRINE 15; 5 MG/ML; UG/ML
INJECTION, SOLUTION EPIDURAL AS NEEDED
Status: DISCONTINUED | OUTPATIENT
Start: 2020-07-18 | End: 2020-07-18 | Stop reason: HOSPADM

## 2020-07-18 RX ORDER — LIDOCAINE HYDROCHLORIDE AND EPINEPHRINE 20; 5 MG/ML; UG/ML
INJECTION, SOLUTION EPIDURAL; INFILTRATION; INTRACAUDAL; PERINEURAL AS NEEDED
Status: DISCONTINUED | OUTPATIENT
Start: 2020-07-18 | End: 2020-07-18 | Stop reason: HOSPADM

## 2020-07-18 RX ORDER — EPHEDRINE SULFATE/0.9% NACL/PF 50 MG/5 ML
25 SYRINGE (ML) INTRAVENOUS
Status: COMPLETED | OUTPATIENT
Start: 2020-07-18 | End: 2020-07-18

## 2020-07-18 RX ORDER — OXYCODONE AND ACETAMINOPHEN 5; 325 MG/1; MG/1
1 TABLET ORAL
Status: DISCONTINUED | OUTPATIENT
Start: 2020-07-18 | End: 2020-07-21 | Stop reason: HOSPADM

## 2020-07-18 RX ORDER — LIDOCAINE HYDROCHLORIDE AND EPINEPHRINE 20; 5 MG/ML; UG/ML
INJECTION, SOLUTION EPIDURAL; INFILTRATION; INTRACAUDAL; PERINEURAL
Status: COMPLETED
Start: 2020-07-18 | End: 2020-07-18

## 2020-07-18 RX ORDER — OXYTOCIN/RINGER'S LACTATE 20/1000 ML
PLASTIC BAG, INJECTION (ML) INTRAVENOUS
Status: COMPLETED
Start: 2020-07-18 | End: 2020-07-18

## 2020-07-18 RX ORDER — OXYTOCIN/RINGER'S LACTATE 20/1000 ML
PLASTIC BAG, INJECTION (ML) INTRAVENOUS
Status: DISCONTINUED | OUTPATIENT
Start: 2020-07-18 | End: 2020-07-18 | Stop reason: HOSPADM

## 2020-07-18 RX ORDER — DIPHENHYDRAMINE HCL 25 MG
50 CAPSULE ORAL
Status: DISCONTINUED | OUTPATIENT
Start: 2020-07-18 | End: 2020-07-21 | Stop reason: HOSPADM

## 2020-07-18 RX ORDER — ACETAMINOPHEN 325 MG/1
650 TABLET ORAL
Status: DISCONTINUED | OUTPATIENT
Start: 2020-07-18 | End: 2020-07-21 | Stop reason: HOSPADM

## 2020-07-18 RX ORDER — OXYTOCIN/RINGER'S LACTATE 20/1000 ML
999 PLASTIC BAG, INJECTION (ML) INTRAVENOUS AS NEEDED
Status: DISCONTINUED | OUTPATIENT
Start: 2020-07-18 | End: 2020-07-21 | Stop reason: HOSPADM

## 2020-07-18 RX ORDER — MORPHINE SULFATE 10 MG/ML
10 INJECTION, SOLUTION INTRAMUSCULAR; INTRAVENOUS
Status: ACTIVE | OUTPATIENT
Start: 2020-07-18 | End: 2020-07-19

## 2020-07-18 RX ORDER — SODIUM CHLORIDE 0.9 % (FLUSH) 0.9 %
5-40 SYRINGE (ML) INJECTION EVERY 8 HOURS
Status: DISCONTINUED | OUTPATIENT
Start: 2020-07-18 | End: 2020-07-21 | Stop reason: HOSPADM

## 2020-07-18 RX ORDER — OXYTOCIN 10 [USP'U]/ML
INJECTION, SOLUTION INTRAMUSCULAR; INTRAVENOUS AS NEEDED
Status: DISCONTINUED | OUTPATIENT
Start: 2020-07-18 | End: 2020-07-18 | Stop reason: HOSPADM

## 2020-07-18 RX ORDER — SODIUM CHLORIDE, SODIUM LACTATE, POTASSIUM CHLORIDE, CALCIUM CHLORIDE 600; 310; 30; 20 MG/100ML; MG/100ML; MG/100ML; MG/100ML
125 INJECTION, SOLUTION INTRAVENOUS CONTINUOUS
Status: DISCONTINUED | OUTPATIENT
Start: 2020-07-18 | End: 2020-07-21 | Stop reason: HOSPADM

## 2020-07-18 RX ORDER — SODIUM CHLORIDE 0.9 % (FLUSH) 0.9 %
5-40 SYRINGE (ML) INJECTION AS NEEDED
Status: DISCONTINUED | OUTPATIENT
Start: 2020-07-18 | End: 2020-07-21 | Stop reason: HOSPADM

## 2020-07-18 RX ORDER — DEXAMETHASONE SODIUM PHOSPHATE 4 MG/ML
INJECTION, SOLUTION INTRA-ARTICULAR; INTRALESIONAL; INTRAMUSCULAR; INTRAVENOUS; SOFT TISSUE AS NEEDED
Status: DISCONTINUED | OUTPATIENT
Start: 2020-07-18 | End: 2020-07-18 | Stop reason: HOSPADM

## 2020-07-18 RX ORDER — HYDROCORTISONE 1 %
CREAM (GRAM) TOPICAL AS NEEDED
Status: DISCONTINUED | OUTPATIENT
Start: 2020-07-18 | End: 2020-07-21 | Stop reason: HOSPADM

## 2020-07-18 RX ORDER — IBUPROFEN 400 MG/1
800 TABLET ORAL EVERY 8 HOURS
Status: DISCONTINUED | OUTPATIENT
Start: 2020-07-18 | End: 2020-07-21 | Stop reason: HOSPADM

## 2020-07-18 RX ORDER — OXYTOCIN/RINGER'S LACTATE 20/1000 ML
125 PLASTIC BAG, INJECTION (ML) INTRAVENOUS AS NEEDED
Status: DISCONTINUED | OUTPATIENT
Start: 2020-07-18 | End: 2020-07-21 | Stop reason: HOSPADM

## 2020-07-18 RX ORDER — DIPHENHYDRAMINE HYDROCHLORIDE 50 MG/ML
12.5 INJECTION, SOLUTION INTRAMUSCULAR; INTRAVENOUS
Status: DISCONTINUED | OUTPATIENT
Start: 2020-07-18 | End: 2020-07-21 | Stop reason: HOSPADM

## 2020-07-18 RX ORDER — EPHEDRINE SULFATE/0.9% NACL/PF 50 MG/5 ML
SYRINGE (ML) INTRAVENOUS AS NEEDED
Status: DISCONTINUED | OUTPATIENT
Start: 2020-07-18 | End: 2020-07-18 | Stop reason: HOSPADM

## 2020-07-18 RX ORDER — SIMETHICONE 80 MG
80 TABLET,CHEWABLE ORAL
Status: DISCONTINUED | OUTPATIENT
Start: 2020-07-18 | End: 2020-07-21 | Stop reason: HOSPADM

## 2020-07-18 RX ORDER — BUPIVACAINE HYDROCHLORIDE 2.5 MG/ML
INJECTION, SOLUTION EPIDURAL; INFILTRATION; INTRACAUDAL AS NEEDED
Status: DISCONTINUED | OUTPATIENT
Start: 2020-07-18 | End: 2020-07-18 | Stop reason: HOSPADM

## 2020-07-18 RX ORDER — MORPHINE SULFATE 10 MG/ML
6 INJECTION, SOLUTION INTRAMUSCULAR; INTRAVENOUS
Status: ACTIVE | OUTPATIENT
Start: 2020-07-18 | End: 2020-07-19

## 2020-07-18 RX ORDER — AMMONIA 15 % (W/V)
1 AMPUL (EA) INHALATION AS NEEDED
Status: DISCONTINUED | OUTPATIENT
Start: 2020-07-18 | End: 2020-07-21 | Stop reason: HOSPADM

## 2020-07-18 RX ORDER — ONDANSETRON 2 MG/ML
4 INJECTION INTRAMUSCULAR; INTRAVENOUS
Status: ACTIVE | OUTPATIENT
Start: 2020-07-18 | End: 2020-07-19

## 2020-07-18 RX ORDER — DOCUSATE SODIUM 100 MG/1
100 CAPSULE, LIQUID FILLED ORAL
Status: DISCONTINUED | OUTPATIENT
Start: 2020-07-18 | End: 2020-07-21 | Stop reason: HOSPADM

## 2020-07-18 RX ORDER — ZOLPIDEM TARTRATE 5 MG/1
5 TABLET ORAL
Status: DISCONTINUED | OUTPATIENT
Start: 2020-07-18 | End: 2020-07-21 | Stop reason: HOSPADM

## 2020-07-18 RX ORDER — MORPHINE SULFATE 0.5 MG/ML
INJECTION, SOLUTION EPIDURAL; INTRATHECAL; INTRAVENOUS AS NEEDED
Status: DISCONTINUED | OUTPATIENT
Start: 2020-07-18 | End: 2020-07-18 | Stop reason: HOSPADM

## 2020-07-18 RX ORDER — SODIUM CHLORIDE 9 MG/ML
INJECTION, SOLUTION INTRAVENOUS
Status: DISCONTINUED | OUTPATIENT
Start: 2020-07-18 | End: 2020-07-18 | Stop reason: HOSPADM

## 2020-07-18 RX ORDER — OXYCODONE AND ACETAMINOPHEN 5; 325 MG/1; MG/1
2 TABLET ORAL
Status: DISCONTINUED | OUTPATIENT
Start: 2020-07-18 | End: 2020-07-21 | Stop reason: HOSPADM

## 2020-07-18 RX ADMIN — BUTORPHANOL TARTRATE 2 MG: 1 INJECTION, SOLUTION INTRAMUSCULAR; INTRAVENOUS at 00:35

## 2020-07-18 RX ADMIN — SODIUM CHLORIDE, SODIUM LACTATE, POTASSIUM CHLORIDE, AND CALCIUM CHLORIDE 125 ML/HR: 600; 310; 30; 20 INJECTION, SOLUTION INTRAVENOUS at 01:03

## 2020-07-18 RX ADMIN — Medication 20 MG: at 15:19

## 2020-07-18 RX ADMIN — DEXAMETHASONE SODIUM PHOSPHATE 4 MG: 4 INJECTION, SOLUTION INTRAMUSCULAR; INTRAVENOUS at 15:23

## 2020-07-18 RX ADMIN — MORPHINE SULFATE 5 MG: 0.5 INJECTION, SOLUTION EPIDURAL; INTRATHECAL; INTRAVENOUS at 16:11

## 2020-07-18 RX ADMIN — SODIUM CHLORIDE, SODIUM LACTATE, POTASSIUM CHLORIDE, AND CALCIUM CHLORIDE 125 ML/HR: 600; 310; 30; 20 INJECTION, SOLUTION INTRAVENOUS at 18:06

## 2020-07-18 RX ADMIN — Medication 10 ML/HR: at 08:17

## 2020-07-18 RX ADMIN — ONDANSETRON HYDROCHLORIDE 4 MG: 2 INJECTION, SOLUTION INTRAMUSCULAR; INTRAVENOUS at 15:22

## 2020-07-18 RX ADMIN — SODIUM CHLORIDE 3 G: 9 INJECTION, SOLUTION INTRAVENOUS at 15:03

## 2020-07-18 RX ADMIN — PHENYLEPHRINE HYDROCHLORIDE 80 MCG: 10 INJECTION INTRAVENOUS at 15:20

## 2020-07-18 RX ADMIN — Medication 25 MG: at 14:36

## 2020-07-18 RX ADMIN — ONDANSETRON HYDROCHLORIDE 4 MG: 2 INJECTION, SOLUTION INTRAMUSCULAR; INTRAVENOUS at 16:10

## 2020-07-18 RX ADMIN — PHENYLEPHRINE HYDROCHLORIDE 80 MCG: 10 INJECTION INTRAVENOUS at 15:58

## 2020-07-18 RX ADMIN — LIDOCAINE HYDROCHLORIDE AND EPINEPHRINE 5 ML: 20; 5 INJECTION, SOLUTION EPIDURAL; INFILTRATION; INTRACAUDAL; PERINEURAL at 14:40

## 2020-07-18 RX ADMIN — LIDOCAINE HYDROCHLORIDE,EPINEPHRINE BITARTRATE 3 ML: 15; .005 INJECTION, SOLUTION EPIDURAL; INFILTRATION; INTRACAUDAL; PERINEURAL at 08:06

## 2020-07-18 RX ADMIN — FENTANYL CITRATE 100 MCG: 50 INJECTION, SOLUTION INTRAMUSCULAR; INTRAVENOUS at 14:42

## 2020-07-18 RX ADMIN — LIDOCAINE HYDROCHLORIDE AND EPINEPHRINE 5 ML: 20; 5 INJECTION, SOLUTION EPIDURAL; INFILTRATION; INTRACAUDAL; PERINEURAL at 14:43

## 2020-07-18 RX ADMIN — BUTORPHANOL TARTRATE 2 MG: 1 INJECTION, SOLUTION INTRAMUSCULAR; INTRAVENOUS at 03:27

## 2020-07-18 RX ADMIN — FENTANYL CITRATE 100 MCG: 50 INJECTION INTRAMUSCULAR; INTRAVENOUS at 08:05

## 2020-07-18 RX ADMIN — LIDOCAINE HYDROCHLORIDE AND EPINEPHRINE 3 ML: 20; 5 INJECTION, SOLUTION EPIDURAL; INFILTRATION; INTRACAUDAL; PERINEURAL at 14:45

## 2020-07-18 RX ADMIN — AZITHROMYCIN MONOHYDRATE 500 MG: 500 INJECTION, POWDER, LYOPHILIZED, FOR SOLUTION INTRAVENOUS at 15:36

## 2020-07-18 RX ADMIN — FENTANYL CITRATE 50 MCG: 50 INJECTION, SOLUTION INTRAMUSCULAR; INTRAVENOUS at 06:22

## 2020-07-18 RX ADMIN — BUPIVACAINE HYDROCHLORIDE 10 ML: 2.5 INJECTION, SOLUTION EPIDURAL; INFILTRATION; INTRACAUDAL; PERINEURAL at 08:09

## 2020-07-18 RX ADMIN — TERBUTALINE SULFATE 0.25 MG: 1 INJECTION SUBCUTANEOUS at 14:23

## 2020-07-18 RX ADMIN — FENTANYL CITRATE 100 MCG: 50 INJECTION, SOLUTION INTRAMUSCULAR; INTRAVENOUS at 08:09

## 2020-07-18 RX ADMIN — SODIUM CHLORIDE, SODIUM LACTATE, POTASSIUM CHLORIDE, AND CALCIUM CHLORIDE 125 ML/HR: 600; 310; 30; 20 INJECTION, SOLUTION INTRAVENOUS at 10:03

## 2020-07-18 RX ADMIN — Medication 999 ML/HR: at 15:42

## 2020-07-18 RX ADMIN — KETOROLAC TROMETHAMINE 30 MG: 30 INJECTION, SOLUTION INTRAMUSCULAR at 20:28

## 2020-07-18 RX ADMIN — PHENYLEPHRINE HYDROCHLORIDE 40 MCG: 10 INJECTION INTRAVENOUS at 15:49

## 2020-07-18 RX ADMIN — OXYTOCIN 10 UNITS: 10 INJECTION INTRAVENOUS at 15:48

## 2020-07-18 RX ADMIN — SODIUM CHLORIDE, SODIUM LACTATE, POTASSIUM CHLORIDE, AND CALCIUM CHLORIDE 999 ML/HR: 600; 310; 30; 20 INJECTION, SOLUTION INTRAVENOUS at 07:49

## 2020-07-18 RX ADMIN — BUPIVACAINE HYDROCHLORIDE 150 MG: 5 INJECTION, SOLUTION EPIDURAL; INTRACAUDAL; PERINEURAL at 08:05

## 2020-07-18 RX ADMIN — SODIUM CHLORIDE: 900 INJECTION, SOLUTION INTRAVENOUS at 15:11

## 2020-07-18 RX ADMIN — Medication 10 MG: at 14:54

## 2020-07-18 RX ADMIN — LIDOCAINE HYDROCHLORIDE,EPINEPHRINE BITARTRATE: 20; .005 INJECTION, SOLUTION EPIDURAL; INFILTRATION; INTRACAUDAL; PERINEURAL at 14:35

## 2020-07-18 RX ADMIN — PHENYLEPHRINE HYDROCHLORIDE 80 MCG: 10 INJECTION INTRAVENOUS at 15:52

## 2020-07-18 NOTE — ROUTINE PROCESS
1830: TRANSFER - IN REPORT:    Verbal report received from SHITAL Jaimes RN(name) on Min Rosen  being received from L&D (unit) for routine progression of care      Report consisted of patients Situation, Background, Assessment and   Recommendations(SBAR). Information from the following report(s) SBAR was reviewed with the receiving nurse. Opportunity for questions and clarification was provided. Assessment completed upon patients arrival to unit and care assumed.

## 2020-07-18 NOTE — PROGRESS NOTES
Labor Progress Note  Patient seen, fetal heart rate and contraction pattern evaluated. Comfortable with epidural      VSS AF  Physical Exam:  Cervical Exam:3/70/-2:    Membranes:  SROM  Uterine Activity: Frequency: regular  Fetal Heart Rate: Reactive  Accelerations: yes  Decelerations: none  IUPC and FSE in place    Assessment/Plan:  Cervix unchanged for 5 and 1/2 hrs with adequate MVUs 210  Reassuring fetal status. Option for  section discussed at this time  Risks discussed bleeding, infection, risk of injury to bladder, bowel, extension of incision, DVT, PE, injury to , TTN, blood transfusion. Patient verbalizes understanding and all questions answered.   To OR for Primary C/S for Failed Perico Duff MD

## 2020-07-18 NOTE — PROGRESS NOTES
2340-Bedside and Verbal shift change report given to LESTER Stokes RN  (oncoming nurse) by Cristhian Thakkar RN (offgoing nurse). Report included the following information SBAR, Kardex, MAR, Accordion and Recent Results. Client in bed states that she thinks that her water. Copious amount of fluid noted on pad. Client states that she feels cramping when she has contractions. Rito RN ask client about the need for pain medication and client declines the need for pain medication at this time. 0030-CLient request pain medication. 0045-Client express relief from receiving pain medication. 0240-Client inquiring about receiving more pain medication. Inform client that the time frequency to receive the medication was not time yet. Informed client that when it was time for her to receive the medication I would bring the medication in. Client stated that she was okay with that.   0327-Pain medication given per client request. . 0450-Client up to [facility] and then to [facility]. 0340-Client express from medication.

## 2020-07-18 NOTE — PROGRESS NOTES
Labor Progress Note  Assumed care from Dr. Sterling Bergman  25 /o G1 @ 39 weeks undergoing IOL for GHTN and IUGR complicated by maternal Obesity. S/P Cook catheter, SROM at [facility] and currently on 18 milliunits of Pitocin  Now comfortable with Epidural  Patient seen, fetal heart rate and contraction pattern evaluated. Physical Exam:  Cervical Exam:3cm tight/60/-3 +caput IUPC and FSE placed  Membranes: SROM  Uterine Activity: Frequency: Irregular  Fetal Heart Rate: Reactive  Accelerations: yes  Decelerations: variable      Assessment/Plan:  Reassuring fetal status.    Will continue to titrate Pitocin  Indications for  discussed  Blood pressures Stable      Nicolas Chappell MD

## 2020-07-18 NOTE — OP NOTES
Operative Note    Name: Tammie Nguyen [address] Record Number: [id number]   YOB: 1995  Today's Date: 2020      Pre-operative Diagnosis:  IUGR   Failed Induction    Post-operative Diagnosis:   Asynclitic  Nuchal Cord x 2    EBL- 1100 cc    Operation: Low Cervical Transverse Procedure(s):   SECTION    Surgeon(s):  MD Arianna Melendez RN- Assist    Anesthesia: Epidural    Prophylactic Antibiotics:  Ancef and Zithromax  DVT Prophylaxis: Sequential Compression Devices         Fetal Description: hazel     Birth Information:   Information for the patient's :  Gianfranco Kaur [[id number]]   Delivery of a   female infant on 2020 at 3:40 PM. Apgars were 9  and 9 . Umbilical Cord: 3 Vessels     Umbilical Cord Events: Nuchal Cord Without Compressions     Placenta: Expressed removal with Normal appearance. Amniotic Fluid Volume: Moderate     Amniotic Fluid Description:  Clear        Umbilical Cord: Nuchal Cord x  2    Placenta:  expressed    Specimens: Placenta           Complications:  uterine inertia    Procedure Detail:      After proper patient identification and consent, the patient was taken to the operating room, where epidural anesthesia was administered and found to be adequate. Mejias catheter had been placed using sterile technique. The patient was prepped and draped in the normal sterile fashion. The abdomen was entered using the Pfannenstiel technique. The peritoneum was entered sharply well superior to the bladder without any apparent injury. The bladder flap was created without difficulty. A low transverse uterine incision was made with the scalpel and extended with blunt finger dissection. Amniotomy was performed and the fluid was medium amount clear. The babys head was then delivered atraumatically. The nose and mouth were suctioned.  The cord was clamped and cut and the baby was handed off to Nursing staff in attendance. Placenta was then removed from the uterus. Uterine inertia encountered. 800 mcg cytotec placed in the uterine cavity. The uterus was curettaged with a moist lap pad and cleared of all clots and debris. The uterine incision was closed with 0 monocryl, double layer  in running locking fashion with good hemostasis assured followed by an embricating stitch. The posterior cul-de-sac was irrigated with warm normal saline. Good hemostasis was again reassured and the uterus was returned to the abdomen. The anterior pelvis was irrigated with warm normal saline and good hemostasis was again reassured throughout. The rectus muscles were checked for hemostasis. The fascia was closed with 0 PDS in a running fashion. Good hemostasis was assured. Plain gut was used for approximation of the subcutaneous space. The skin was closed with a 4-O monocryl  closure. The patient tolerated the procedure well. Sponge, lap, and needle counts were correct times three and the patient and baby were taken to recovery/postpartum room in stable condition.     Lona Garcia MD  July 18, 2020  4:20 PM

## 2020-07-18 NOTE — PROGRESS NOTES
Labor Progress Note  Contractions getting stronger. She asked for some pain medication and received Stadol earlier. Her water broke around midnight. Patient Vitals for the past 1 hrs:   BP Pulse Resp   07/18/20 0102 148/84 76 16       Physical Exam:  Cervical Exam: checked and unchanged, I'd say closer to 2  Membranes:  SROM around midnight  Uterine Activity: q2-5 min  Fetal Heart Rate: Baseline: 130 per minute  Variability: moderate  Accelerations: yes  Decelerations: none    Assessment/Plan:  Now ruptured. Continue with titrating pitocin. .Recheck in 6 hours or PRN.

## 2020-07-18 NOTE — ANESTHESIA PREPROCEDURE EVALUATION
Relevant Problems   No relevant active problems       Anesthetic History   No history of anesthetic complications            Review of Systems / Medical History  Patient summary reviewed, nursing notes reviewed and pertinent labs reviewed    Pulmonary            Asthma        Neuro/Psych   Within defined limits           Cardiovascular  Within defined limits                Exercise tolerance: >4 METS     GI/Hepatic/Renal  Within defined limits              Endo/Other        Morbid obesity     Other Findings              Physical Exam    Airway  Mallampati: II  TM Distance: > 6 cm  Neck ROM: normal range of motion   Mouth opening: Normal     Cardiovascular  Regular rate and rhythm,  S1 and S2 normal,  no murmur, click, rub, or gallop             Dental  No notable dental hx       Pulmonary  Breath sounds clear to auscultation               Abdominal  GI exam deferred       Other Findings            Anesthetic Plan    ASA: 3  Anesthesia type: epidural            Anesthetic plan and risks discussed with: Patient and Family

## 2020-07-18 NOTE — ANESTHESIA PROCEDURE NOTES
Epidural Block    Performed by: Dhruv Penny MD  Authorized by: Dhruv Penny MD     Pre-Procedure  Indication: labor epidural    Preanesthetic Checklist: risks and benefits discussed and timeout performed      Epidural:   Patient position:  Seated  Prep region:  Lumbar  Prep: Chlorhexidine    Location:  L2-3    Needle and Epidural Catheter:   Needle Type:  Tuohy  Needle Gauge:  17 G  Injection Technique:  Loss of resistance using air  Attempts:  1  Catheter Size:  19 G  Catheter at Skin Depth (cm):  12.5  Depth in Epidural Space (cm):  5  Events: no blood with aspiration, no cerebrospinal fluid with aspiration, no paresthesia and negative aspiration test    Test Dose:  Bupivacaine 0.25% and negative    Assessment:   Catheter Secured:  Tegaderm and tape  Insertion:  Uncomplicated  Patient tolerance:  Patient tolerated the procedure well with no immediate complications

## 2020-07-18 NOTE — PROGRESS NOTES
0730-Bedside and Verbal shift change report given to SHITAL Bernstein RN  (oncoming nurse) by LESTER Stokes RN  (offgoing nurse). Report included the following information SBAR. Pt being bolused for epidural.   0800-Dr Seals at bedside for epidural placement. 0810-Epidural placed. Pt on external pulse ox machine. sats 98% on RA.  0825-Dr. Cervantes at bedside viewed strip. 0830-SVE by DR Wilbur Cavazos. IUPC and FSE placed. 1402-DR Cervantes at bedside, viewed strip. SVE done. No change. Discussing POC with patient, offered  section. Pt would like moment to discuss with significant other. All questions answered. 1416-Pt wishes to proceed with c/section. 1420-Dr cervantes aware. Order to discontinue pitocin and give terb 0.25mg SQ now. 1433-Dr Tadeo at bedside to dose epidural.  Orders received for ephedrine 25 mg IM now then 10mg IV prior to OR.   1456-Dr Cervantes at bedside. 1508-Pt or OR 1 for primary c/section for failure to progress. No complaints voiced. 1630-pt back to room 7 via OR. Continous pulse ox applied. 181-Verbal shift change report given to [facility]  (oncoming nurse) by Billy Bernstein RN  (offgoing nurse). Report included the following information SBAR. Nicole Barakat RN to take patient to MIU room 337 at 1830.

## 2020-07-18 NOTE — PROGRESS NOTES
Labor Progress Note  I assumed care of patient around 6 PM. She is being induced for fetal growth restriction. EFW 8%. She was on pit during the day with minimal change so took a break, showered and ate. Patient Vitals for the past 1 hrs:   BP Temp Pulse Resp   20 123/84 99 °F (37.2 °C) 84 16       Physical Exam:  Cervical Exam:  Deferred, was 3/30/-4 around 6 pm  Membranes:  Intact  Uterine Activity: None  Fetal Heart Rate: Baseline: 145 per minute  Variability: moderate  Accelerations: yes  Decelerations: none    Assessment/Plan:  27 yo  induction for fetal growth restriction. Will restart pitocin and titrate to effect.

## 2020-07-19 PROBLEM — Z98.891 S/P CESAREAN SECTION: Status: ACTIVE | Noted: 2020-07-19

## 2020-07-19 PROBLEM — O13.9 GESTATIONAL HYPERTENSION: Status: ACTIVE | Noted: 2020-07-19

## 2020-07-19 LAB
ABO + RH BLD: NORMAL
BASOPHILS # BLD: 0 K/UL (ref 0–0.1)
BASOPHILS NFR BLD: 0 % (ref 0–1)
BLOOD GROUP ANTIBODIES SERPL: NORMAL
DIFFERENTIAL METHOD BLD: ABNORMAL
EOSINOPHIL # BLD: 0 K/UL (ref 0–0.4)
EOSINOPHIL NFR BLD: 0 % (ref 0–7)
ERYTHROCYTE [DISTWIDTH] IN BLOOD BY AUTOMATED COUNT: 13.3 % (ref 11.5–14.5)
HCT VFR BLD AUTO: 29.8 % (ref 35–47)
HGB BLD-MCNC: 9.8 G/DL (ref 11.5–16)
IMM GRANULOCYTES # BLD AUTO: 0.1 K/UL (ref 0–0.04)
IMM GRANULOCYTES NFR BLD AUTO: 1 % (ref 0–0.5)
LYMPHOCYTES # BLD: 1.8 K/UL (ref 0.8–3.5)
LYMPHOCYTES NFR BLD: 12 % (ref 12–49)
MCH RBC QN AUTO: 29.6 PG (ref 26–34)
MCHC RBC AUTO-ENTMCNC: 32.9 G/DL (ref 30–36.5)
MCV RBC AUTO: 90 FL (ref 80–99)
MONOCYTES # BLD: 0.8 K/UL (ref 0–1)
MONOCYTES NFR BLD: 5 % (ref 5–13)
NEUTS SEG # BLD: 13.2 K/UL (ref 1.8–8)
NEUTS SEG NFR BLD: 82 % (ref 32–75)
NRBC # BLD: 0 K/UL (ref 0–0.01)
NRBC BLD-RTO: 0 PER 100 WBC
PLATELET # BLD AUTO: 262 K/UL (ref 150–400)
PMV BLD AUTO: 9.7 FL (ref 8.9–12.9)
RBC # BLD AUTO: 3.31 M/UL (ref 3.8–5.2)
SPECIMEN EXP DATE BLD: NORMAL
WBC # BLD AUTO: 16 K/UL (ref 3.6–11)

## 2020-07-19 PROCEDURE — 74011250636 HC RX REV CODE- 250/636: Performed by: ANESTHESIOLOGY

## 2020-07-19 PROCEDURE — 85025 COMPLETE CBC W/AUTO DIFF WBC: CPT

## 2020-07-19 PROCEDURE — 65410000002 HC RM PRIVATE OB

## 2020-07-19 PROCEDURE — 74011250637 HC RX REV CODE- 250/637: Performed by: OBSTETRICS & GYNECOLOGY

## 2020-07-19 PROCEDURE — 36415 COLL VENOUS BLD VENIPUNCTURE: CPT

## 2020-07-19 RX ORDER — DOCUSATE SODIUM 100 MG/1
100 CAPSULE, LIQUID FILLED ORAL
Qty: 30 CAP | Refills: 1 | Status: SHIPPED | OUTPATIENT
Start: 2020-07-19 | End: 2020-10-17

## 2020-07-19 RX ORDER — IBUPROFEN 800 MG/1
800 TABLET ORAL
Qty: 40 TAB | Refills: 1 | Status: SHIPPED | OUTPATIENT
Start: 2020-07-19

## 2020-07-19 RX ORDER — OXYCODONE AND ACETAMINOPHEN 5; 325 MG/1; MG/1
1 TABLET ORAL
Qty: 20 TAB | Refills: 0 | Status: SHIPPED | OUTPATIENT
Start: 2020-07-19 | End: 2020-07-26

## 2020-07-19 RX ADMIN — Medication 10 ML: at 09:50

## 2020-07-19 RX ADMIN — ACETAMINOPHEN 650 MG: 325 TABLET ORAL at 15:22

## 2020-07-19 RX ADMIN — DOCUSATE SODIUM 100 MG: 100 CAPSULE, LIQUID FILLED ORAL at 20:24

## 2020-07-19 RX ADMIN — OXYCODONE HYDROCHLORIDE AND ACETAMINOPHEN 1 TABLET: 5; 325 TABLET ORAL at 22:00

## 2020-07-19 RX ADMIN — IBUPROFEN 800 MG: 400 TABLET, FILM COATED ORAL at 16:44

## 2020-07-19 RX ADMIN — KETOROLAC TROMETHAMINE 30 MG: 30 INJECTION, SOLUTION INTRAMUSCULAR at 09:49

## 2020-07-19 RX ADMIN — KETOROLAC TROMETHAMINE 30 MG: 30 INJECTION, SOLUTION INTRAMUSCULAR at 02:57

## 2020-07-19 NOTE — LACTATION NOTE
This note was copied from a baby's chart. Initial Lactation consultation:  Mom is 20yo  delivered SGA infant yesterday at [address] gestation 43 weeks. NUrses were concerned after delivery that infant tongue thrusts and uncoordinated suck. After discussion with LC, they started tongue training exercises and using pacifier to try to coordinate suck with mixed results. Mom has short nipples and infant loses them easily. Infant blood sugars have been stable. Mom seen this morning and infant continues to pull tongue back in front of nipple. Discussed use and care of shield as another method to help infant develop more efficient breast feeding suck. Infant regurgitated large amount of clear amniotic fluid. Re explained to mom use of bulb syringe, positioning for choking child, and emergency call bells. After incident, assisted mom to latch infant in seated position using shield and drops of Sweetease to initiate suckling. Baby nursing well,  deep latch obtained, mother is comfortable, baby feeding vigorously with rhythmic suck, swallow, breathe pattern, audible swallowing, and evident milk transfer, both breasts offered, baby is asleep following feeding.  behaviors reviewed, Very sleepy in first 24 hours, mother must wake baby for feedings, offer hand expressed drops, baby usually will respond and feed vigorously 6-8 times in the first day, but is important to offer 8-12 times,  After baby wakes from deep sleep usually on the 2nd or 3rd day a new behavior pattern follows. Frequent feeding during this brief behavioral phase preceeding milk transition is called cluster feeding. Typical  behavior: baby becomes vigorous at the breast and wants to feed frequently- every 1-2 hours for several feedings. This is the normal process by which the baby demands his/her supply. This type of frequent feeding is the stimulation which causes lactogenesis II (milk coming in). Feeding Plan:   Mother will keep baby skin to skin as often as possible, feed on demand, 8-12x/day , respond to feeding cues, obtain latch, listen for audible swallowing, be aware of signs of oxytocin release/ cramping,thirst,sleepiness while breastfeeding, offer both breasts,and will not limit feedings. Mother agrees to utilize breast massage while nursing to facilitate lactogenesis. Mom will continue to use shield today and parents will do jaw massage and tongue training exercises. Infant has more coordinated suck and tongue extension after intervention.

## 2020-07-19 NOTE — PROGRESS NOTES
Bedside shift change report given to GORDO Morris (oncoming nurse) by Giovanna Lipscomb (offgoing nurse). Report included the following information SBAR.     0330: Removed marquez per protocol and assisted patient to restroom. Noted there were three 2-inch sized clots in toilet bowl. Assisted patient back to bed, assessed fundus. Fundus firm U-1, small amount of red lochia noted. Vital signs stable. No trickling or further clots noted.

## 2020-07-19 NOTE — ROUTINE PROCESS
Bedside shift change report given to SHITAL Lipscomb RN (oncoming nurse) by [facility] (offgoing nurse). Report included the following information SBAR, Intake/Output and MAR.

## 2020-07-19 NOTE — ROUTINE PROCESS
Verbal shift change report given to LESTER Teixeira RN (oncoming nurse) by Lore Villarreal RN (offgoing nurse). Report included the following information SBAR, Intake/Output, MAR and Recent Results.

## 2020-07-19 NOTE — PROGRESS NOTES
Post-Operative  Day 1    Kesha Polo     Assessment:   Post-Op day 1 s/p PLTCS for failure to progress in labor, stable  GHTN, BPs normal to mild range without meds, asymptomatic  Baby girl      Plan:   1. Routine post-operative care   2. N/A girl   3. Am d/c in    Information for the patient's :  Les Fallon [[id number]]   , Low Transverse    Patient doing well without significant complaint. Nausea and vomiting resolved, tolerating liquids, no flatus, marquez in place. Vitals:  Visit Vitals  /90 (BP 1 Location: Right arm, BP Patient Position: At rest)   Pulse 85   Temp 98.1 °F (36.7 °C)   Resp 16   Ht 5' 3\" (1.6 m)   Wt 109.3 kg (241 lb)   SpO2 97%   Breastfeeding Unknown   BMI 42.69 kg/m²     Temp (24hrs), Av °F (36.7 °C), Min:97.6 °F (36.4 °C), Max:98.5 °F (36.9 °C)      Last 24hr Input/Output:    Intake/Output Summary (Last 24 hours) at 2020 0939  Last data filed at 2020 0302  Gross per 24 hour   Intake    Output 2455 ml   Net -2455 ml          Exam:        Patient without distress. Lungs clear. Abdomen, bowel sounds present, soft, expected tenderness, fundus firm Wound dressing intact     Perineum normal lochia noted               Lower extremities are negative for swelling, cords or tenderness.     Labs:   Lab Results   Component Value Date/Time    WBC 16.0 (H) 2020 06:22 AM    WBC 11.9 (H) 2020 05:08 PM    HGB 9.8 (L) 2020 06:22 AM    HGB 11.2 (L) 2020 05:08 PM    HCT 29.8 (L) 2020 06:22 AM    HCT 33.6 (L) 2020 05:08 PM    PLATELET 938  06:22 AM    PLATELET 635 8494 05:08 PM       Recent Results (from the past 24 hour(s))   TYPE & SCREEN    Collection Time: 20  2:51 PM   Result Value Ref Range    Crossmatch Expiration 2020     ABO/Rh(D) Lilyan Flori POSITIVE     Antibody screen NEG    CBC WITH AUTOMATED DIFF    Collection Time: 20  6:22 AM   Result Value Ref Range    WBC 16.0 (H) 3.6 - 11.0 K/uL    RBC 3.31 (L) 3.80 - 5.20 M/uL    HGB 9.8 (L) 11.5 - 16.0 g/dL    HCT 29.8 (L) 35.0 - 47.0 %    MCV 90.0 80.0 - 99.0 FL    MCH 29.6 26.0 - 34.0 PG    MCHC 32.9 30.0 - 36.5 g/dL    RDW 13.3 11.5 - 14.5 %    PLATELET 079 062 - 540 K/uL    MPV 9.7 8.9 - 12.9 FL    NRBC 0.0 0  WBC    ABSOLUTE NRBC 0.00 0.00 - 0.01 K/uL    NEUTROPHILS 82 (H) 32 - 75 %    LYMPHOCYTES 12 12 - 49 %    MONOCYTES 5 5 - 13 %    EOSINOPHILS 0 0 - 7 %    BASOPHILS 0 0 - 1 %    IMMATURE GRANULOCYTES 1 (H) 0.0 - 0.5 %    ABS. NEUTROPHILS 13.2 (H) 1.8 - 8.0 K/UL    ABS. LYMPHOCYTES 1.8 0.8 - 3.5 K/UL    ABS. MONOCYTES 0.8 0.0 - 1.0 K/UL    ABS. EOSINOPHILS 0.0 0.0 - 0.4 K/UL    ABS. BASOPHILS 0.0 0.0 - 0.1 K/UL    ABS. IMM.  GRANS. 0.1 (H) 0.00 - 0.04 K/UL    DF AUTOMATED

## 2020-07-19 NOTE — DISCHARGE SUMMARY
Obstetrical Discharge Summary     Name: Teressa Glover MRN: [id number]  SSN: [id number]    YOB: 1995  Age: 26 y.o. Sex: female      Admit Date: 2020    Discharge Date: 2020     Admitting Physician: Army Tee MD     Attending Physician:  Dylan Nielsen MD     Admission Diagnoses: IUGR (intrauterine growth restriction) affecting care of mother [O36.5990]    Procedure Performed:  Procedure(s):   SECTION  Surgical     Used for misc procedures    Discharge Diagnoses:   Information for the patient's :  Neetu Has [[id number]]   Delivery of a 2.385 kg female infant via , Low Transverse on 2020 at 3:40 PM  by Enedelia Quintero. Apgars were 9  and 9 . Additional Diagnoses:   Hospital Problems  Never Reviewed          Codes Class Noted POA    Gestational hypertension ICD-10-CM: O13.9  ICD-9-CM: 642.30  2020 Unknown        S/P  section ICD-10-CM: G12.998  ICD-9-CM: V45.89  2020 Unknown        IUGR (intrauterine growth restriction) affecting care of mother ICD-10-CM: O36.5990  ICD-9-CM: 656.50  2020 Unknown             Lab Results   Component Value Date/Time    Rubella, External Immune 2019    GrBStrep, External negative 2020       Hospital Course: Normal hospital course following the delivery. GHTN - BPs controlled without antihypertensives. Patient Disposition: Home      Followup Care:  Discharge Condition: stable  No lifting, sex or Strenuous exercise for 6 weeks  Regular Diet  Keep wound clean and dry    Patient Instructions:   Current Discharge Medication List      START taking these medications    Details   docusate sodium (COLACE) 100 mg capsule Take 1 Cap by mouth two (2) times daily as needed for Constipation for up to 90 days. Qty: 30 Cap, Refills: 1      ibuprofen (MOTRIN) 800 mg tablet Take 1 Tab by mouth every eight (8) hours as needed for Pain.   Qty: 40 Tab, Refills: 1 oxyCODONE-acetaminophen (PERCOCET) 5-325 mg per tablet Take 1 Tab by mouth every six (6) hours as needed for Pain for up to 7 days. Max Daily Amount: 4 Tabs. Qty: 20 Tab, Refills: 0    Associated Diagnoses: S/P  section         CONTINUE these medications which have NOT CHANGED    Details   PNV No12-Iron-FA-DSS-OM-3 29 mg iron-1 mg -50 mg CPKD Take 1 Tab by mouth daily. Indications: pregnancy      iron, carbonyl (FEOSOL) 45 mg tab Take 1 Tab by mouth daily. Indications: anemia from inadequate iron         STOP taking these medications       aspirin delayed-release 81 mg tablet Comments:   Reason for Stopping:               Reference my discharge instructions. Follow-up Appointments   Procedures    FOLLOW UP VISIT Appointment in: One Week     Standing Status:   Standing     Number of Occurrences:   1     Order Specific Question:   Appointment in     Answer:    One Week        Signed By:  Bebeto Wynn MD     2020

## 2020-07-19 NOTE — ROUTINE PROCESS
Bedside shift change report given to Mark Gann RN (oncoming nurse) by LESTER Vincent RN (offgoing nurse). Report included the following information SBAR.

## 2020-07-20 PROCEDURE — 65410000002 HC RM PRIVATE OB

## 2020-07-20 PROCEDURE — 74011250637 HC RX REV CODE- 250/637: Performed by: OBSTETRICS & GYNECOLOGY

## 2020-07-20 RX ADMIN — IBUPROFEN 800 MG: 400 TABLET, FILM COATED ORAL at 08:18

## 2020-07-20 RX ADMIN — OXYCODONE HYDROCHLORIDE AND ACETAMINOPHEN 1 TABLET: 5; 325 TABLET ORAL at 21:56

## 2020-07-20 RX ADMIN — IBUPROFEN 800 MG: 400 TABLET, FILM COATED ORAL at 15:54

## 2020-07-20 RX ADMIN — IBUPROFEN 800 MG: 400 TABLET, FILM COATED ORAL at 00:37

## 2020-07-20 RX ADMIN — OXYCODONE HYDROCHLORIDE AND ACETAMINOPHEN 1 TABLET: 5; 325 TABLET ORAL at 03:27

## 2020-07-20 NOTE — ROUTINE PROCESS
0730: Bedside shift change report given to SHITAL Kahn RN (oncoming nurse) by Cathi Michelle RN (offgoing nurse). Report included the following information SBAR.

## 2020-07-20 NOTE — PROGRESS NOTES
Bedside shift change report given to G. Sharene Sacks (oncoming nurse) by Joao Nieto RN (offgoing nurse). Report included the following information SBAR.

## 2020-07-20 NOTE — PROGRESS NOTES
Post-Operative  Day 2    Kenna French     Assessment: Post-Op day 2, doing well    Plan:   1. Routine post-operative care  2. Discharge home tomorrow  3. GHTN: normotensive, no meds    Information for the patient's :  Shimon Loya [[id number]]   , Low Transverse     Subjective:  Patient doing well without significant complaint. Nausea and vomiting resolved, tolerating liquids, passing flatus, voiding and ambulating without difficulty. Vitals:  Visit Vitals  /77 (BP 1 Location: Left arm, BP Patient Position: At rest;Sitting)   Pulse 79   Temp 97.6 °F (36.4 °C)   Resp 16   Ht 5' 3\" (1.6 m)   Wt 109.3 kg (241 lb)   SpO2 97%   Breastfeeding Unknown   BMI 42.69 kg/m²     Temp (24hrs), Av.9 °F (36.6 °C), Min:97.5 °F (36.4 °C), Max:98.2 °F (36.8 °C)        Exam:      Patient without distress. Abdomen, bowel sounds present, soft, expected tenderness, fundus firm                Wound incision clean, dry and intact               Lower extremities: no calf tenderness    Labs:   Lab Results   Component Value Date/Time    WBC 16.0 (H) 2020 06:22 AM    WBC 11.9 (H) 2020 05:08 PM    HGB 9.8 (L) 2020 06:22 AM    HGB 11.2 (L) 2020 05:08 PM    HCT 29.8 (L) 2020 06:22 AM    HCT 33.6 (L) 2020 05:08 PM    PLATELET 755  06:22 AM    PLATELET 967  05:08 PM       No results found for this or any previous visit (from the past 24 hour(s)).

## 2020-07-20 NOTE — LACTATION NOTE
This note was copied from a baby's chart. Per mom, infant improving at breast. Mom is using the shield. Encouraged her to continue to try to wean infant off of shield and to manually express following nursing. Mom expresses comfort with this plan. Denies questions or concerns for lactation at this time.

## 2020-07-21 VITALS
DIASTOLIC BLOOD PRESSURE: 85 MMHG | HEART RATE: 80 BPM | OXYGEN SATURATION: 97 % | SYSTOLIC BLOOD PRESSURE: 107 MMHG | WEIGHT: 241 LBS | HEIGHT: 63 IN | RESPIRATION RATE: 16 BRPM | BODY MASS INDEX: 42.7 KG/M2 | TEMPERATURE: 98 F

## 2020-07-21 PROCEDURE — 74011250637 HC RX REV CODE- 250/637: Performed by: OBSTETRICS & GYNECOLOGY

## 2020-07-21 RX ADMIN — OXYCODONE HYDROCHLORIDE AND ACETAMINOPHEN 2 TABLET: 5; 325 TABLET ORAL at 15:10

## 2020-07-21 RX ADMIN — IBUPROFEN 800 MG: 400 TABLET, FILM COATED ORAL at 00:11

## 2020-07-21 RX ADMIN — IBUPROFEN 800 MG: 400 TABLET, FILM COATED ORAL at 09:07

## 2020-07-21 NOTE — LACTATION NOTE
This note was copied from a baby's chart. Per mom,baby nursing well and has improved throughout post partum stay, deep latch maintained using shield, mother is comfortable, milk is in transition, baby feeding vigorously with rhythmic suck, swallow, breathe pattern, with audible swallowing, and evident milk transfer, both breasts offered, baby is asleep following feeding. Baby is feeding on demand, voiding (11) and stools (7) present as appropriate since birth. Weight loss:  8.8% at 60 hours of life     Mom has dense nipples and infant has a hard time getting the whole nipple in her mouth. Suggested to mom that she continue to try to wean infant off of the shield and strategies to do so discussed. Mom's breasts are filling   breasts may become engorged when milk \"comes in\". How milk is made / normal phases of milk production, supply and demand discussed. Taught care of engorged breasts - frequent breastfeeding encouraged, warm compresses and breast massage ac. Then nurse the baby or pump. Apply cold compresses pc x 15 minutes a few times a day for swelling or discomfort. May need to do this care for a couple of days. Discussed prevention and treatment of mastitis.

## 2020-07-21 NOTE — ROUTINE PROCESS
2000- Bedside shift change report given to MAGNUS Coronado RN (oncoming nurse) by James Kahn RN (offgoing nurse). Report included the following information SBAR.

## 2020-07-21 NOTE — ROUTINE PROCESS
Bedside shift change report given to MADIHA Linder RN (oncoming nurse) by Agusto Sotelo. Kaylin Coronado RN (offgoing nurse). Report included the following information SBAR, Kardex, Intake/Output and MAR.

## 2020-07-21 NOTE — PROGRESS NOTES
Post-Operative  Day 3    Halle Santizo       Assessment: Post-Op day 3, doing well  GHTN - normotensive not on meds    Plan:   1. Discharge home today  2. Follow up in office in 6 weeks with Michael Hood MD  3. Post partum activity/wound care advised, diet as tolerated  4. Discharge Medications: ibuprofen, percocet and medications prior to admission      Information for the patient's :  Noni Argueta [[id number]]   , Low Transverse    Patient doing well without significant complaint. Tolerating diet, passing flatus, voiding and ambulating without difficulty    Vitals:  Visit Vitals  /85 (BP 1 Location: Left arm, BP Patient Position: At rest;Sitting)   Pulse 80   Temp 98 °F (36.7 °C)   Resp 16   Ht 5' 3\" (1.6 m)   Wt 109.3 kg (241 lb)   SpO2 97%   Breastfeeding Unknown   BMI 42.69 kg/m²     Temp (24hrs), Av.9 °F (36.6 °C), Min:97.7 °F (36.5 °C), Max:98 °F (36.7 °C)        Exam:        Patient without distress. Abdomen, bowel sounds present, soft, expected tenderness, fundus firm                Wound incision clean, dry and intact               Lower extremities are negative for swelling, cords or tenderness. Labs:   Lab Results   Component Value Date/Time    WBC 16.0 (H) 2020 06:22 AM    WBC 11.9 (H) 2020 05:08 PM    HGB 9.8 (L) 2020 06:22 AM    HGB 11.2 (L) 2020 05:08 PM    HCT 29.8 (L) 2020 06:22 AM    HCT 33.6 (L) 2020 05:08 PM    PLATELET 737  06:22 AM    PLATELET 379  05:08 PM       No results found for this or any previous visit (from the past 24 hour(s)).

## 2020-07-21 NOTE — DISCHARGE INSTRUCTIONS
POSTPARTUM DISCHARGE INSTRUCTIONS       Name:  Marcelo Messer  YOB: 1995  Admission Diagnosis:  IUGR (intrauterine growth restriction) affecting care of mother [O36.5990]     Discharge Diagnosis:    Problem List as of 2020 Never Reviewed          Codes Class Noted - Resolved    Gestational hypertension ICD-10-CM: O13.9  ICD-9-CM: 642.30  2020 - Present        S/P  section ICD-10-CM: Z98.891  ICD-9-CM: V45.89  2020 - Present        IUGR (intrauterine growth restriction) affecting care of mother ICD-10-CM: O36.5990  ICD-9-CM: 656.50  2020 - Present            Attending Physician:  Amarilis Mendoza MD    Delivery Type:   Section: What to Expect at Home    Your Recovery:  A  section, or , is surgery to deliver your baby through a cut, called an incision that the doctor makes in your lower belly and uterus. You may have some pain in your lower belly and need pain medicine for 1 to 2 weeks. You can expect some vaginal bleeding for several weeks. You will probably need about 6 weeks to fully recover. It is important to take it easy while the incision is healing. Avoid heavy lifting, strenuous activities, or exercises that strain the belly muscles while you are recovering. Ask a family member or friend for help with housework, cooking, and shopping. This care sheet gives you a general idea about how long it will take for you to recover. But each person recovers at a different pace. Follow the steps below to get better as quickly as possible. How can you care for yourself at home? Activity  · Rest when you feel tired. Getting enough sleep will help you recover. · Try to walk each day. Start by walking a little more than you did the day before. Bit by bit, increase the amount you walk. Walking boosts blood flow and helps prevent pneumonia, constipation, and blood clots.   · Avoid strenuous activities, such as bicycle riding, jogging, weightlifting, and aerobic exercise, for 6 weeks or until your doctor says it is okay. · Until your doctor says it is okay, do not lift anything heavier than your baby. · Do not do sit-ups or other exercise that strain the belly muscles for 6 weeks or until your doctor says it is okay. · Hold a pillow over your incision when you cough or take deep breaths. This will support your belly and decrease your pain. · You may shower as usual. Pat the incision dry when you are done. · You will have some vaginal bleeding. Wear sanitary pads. Do not douche or use tampons until your doctor says it is okay. · Ask your doctor when you can drive again. · You will probably need to take at least 6 weeks off work. It depends on the type of work you do and how you feel. · Wait until you are healed (about 4 to 6 weeks) before you have sexual intercourse. Your doctor will tell you when it is okay to have sex. · Talk to your doctor about birth control. You can get pregnant even before your period returns. Also, you can get pregnant while you are breast-feeding. Incision care  Your skin is your body's first line of defense against germs, but an incision site leaves an easy way for germs to enter your body. To prevent infection:  · Clean your hands frequently and before and after changing any touching any dressings. · If you have strips of tape on the incision, leave the tape on for a week or until it falls off. · Look at your incision closely every day for any changes. Contact your doctor if you experience any signs of infection, such as fever or increased redness at the surgical site. · Wash the area daily with warm, soapy water, and pat it dry. Don't use hydrogen peroxide or alcohol, which can slow healing. You may cover the area with a gauze bandage if it weeps or rubs against clothing. Change the bandage every day. · Keep the area clean and dry. Diet  · You can eat your normal diet.  If your stomach is upset, try bland, low-fat foods like plain rice, broiled chicken, toast, and yogurt. · Drink plenty of fluids (unless your doctor tells you not to). · You may notice that your bowel movements are not regular right after your surgery. This is common. Try to avoid constipation and straining with bowel movements. You may want to take a fiber supplement every day. If you have not had a bowel movement after a couple of days, ask your doctor about taking a mild laxative. · If you are breast-feeding, do not drink any alcohol. Medicines  · Take pain medicines exactly as directed. · If the doctor gave you a prescription medicine for pain, take it as prescribed. · If you are not taking a prescription pain medicine, ask your doctor if you can take an over-the-counter medicine such as acetaminophen (Tylenol), ibuprofen (Advil, Motrin), or naproxen (Aleve), for cramps. Read and follow all instructions on the label. Do not take aspirin, because it can cause more bleeding. Do not take acetaminophen (Tylenol) and other acetaminophen containing medications (i.e. Percocet) at the same time. · If you think your pain medicine is making you sick to your stomach:  · Take your medicine after meals (unless your doctor has told you not to). · Ask your doctor for a different pain medicine. · If your doctor prescribed antibiotics, take them as directed. Do not stop taking them just because you feel better. You need to take the full course of antibiotics. Mental Health  · Many women get the \"baby blues\" during the first few days after childbirth. You may lose sleep, feel irritable, and cry easily. You may feel happy one minute and sad the next. Hormone changes are one cause of these emotional changes. Also, the demands of a new baby, along with visits from relatives or other family needs, add to a mother's stress. The \"baby blues\" often peak around the fourth day. Then they ease up in less than 2 weeks.   · If your moodiness or anxiety lasts for more than 2 weeks, or if you feel like life is not worth living, you may have postpartum depression. This is different for each mother. Some mothers with serious depression may worry intensely about their infant's well-being. Others may feel distant from their child. Some mothers might even feel that they might harm their baby. A mother may have signs of paranoia, wondering if someone is watching her. · With all the changes in your life, you may not know if you are depressed. Pregnancy sometimes causes changes in how you feel that are similar to the symptoms of depression. · Symptoms of depression include:  · Feeling sad or hopeless and losing interest in daily activities. These are the most common symptoms of depression. · Sleeping too much or not enough. · Feeling tired. You may feel as if you have no energy. · Eating too much or too little. · [employer] ([employer]I) offers a Warm line; Chat with the Expert phone sessions; Information and Articles about Pregnancy and Postpartum Mood Disorders; Comprehensive List of Free Support Groups; Knowledgeable local coordinators who will offer support, information, and resources; Guide to Resources on [employer]; Calendar of events in the  mood disorders community; Latest News and Research; and [facility] [address] for [employer]. Remember - You are not alone; You are not to blame; With help, you will be well. 6-110-507-PPD(1323). WWW. POSTPARTUM. NET   · Writing or talking about death, such as writing suicide notes or talking about guns, knives, or pills. Keep the numbers for these national suicide hotlines: 9-352-360-TALK (7-787.806.4442) and 3-204-YPJOFPR (6-355.771.1623). If you or someone you know talks about suicide or feeling hopeless, get help right away.     Other instructions  · If you breast-feed your baby, you may be more comfortable while you are healing if you place the baby so that he or she is not resting on your belly. Try tucking your baby under your arm, with his or her body along the side you will be feeding on. Support your baby's upper body with your arm. With that hand you can control your baby's head to bring his or her mouth to your breast. This is sometimes called the football hold. Follow-up care is a key part of your treatment and safety. Be sure to make and go to all appointments, and call your doctor if you are having problems. It's also a good idea to know your test results and keep a list of the medicines you take. When should you call for help? Call 911 anytime you think you may need emergency care. For example, call if:  · You are thinking of hurting yourself, your baby, or anyone else. · You passed out (lost consciousness). · You have symptoms of a blood clot in your lung (called a pulmonary embolism). These may include:  · Sudden chest pain. · Trouble breathing. · Coughing up blood. Call your doctor now or seek immediate medical care if:    · You have severe vaginal bleeding. · You are soaking through a pad each hour for 2 or more hours. · Your vaginal bleeding seems to be getting heavier or is still bright red 4 days after delivery. · You are dizzy or lightheaded, or you feel like you may faint. · You are vomiting or cannot keep fluids down. · You have a fever. · You have new or more belly pain. · You have loose stitches, or your incision comes open. · You have symptoms of infection, such as:  · Increased pain, swelling, warmth, or redness. · Red streaks leading from the incision. · Pus draining from the incision. · A fever  · You pass tissue (not just blood). · Your vaginal discharge smells bad. · Your belly feels tender or full and hard. · Your breasts are continuously painful or red. · You feel sad, anxious, or hopeless for more than a few days. · You have sudden, severe pain in your belly.   · You have symptoms of a blood clot in your leg (called a deep vein thrombosis), such as:  · Pain in your calf, back of the knee, thigh, or groin. · Redness and swelling in your leg or groin. · You have symptoms of preeclampsia, such as:  · Sudden swelling of your face, hands, or feet. · New vision problems (such as dimness or blurring). · A severe headache. · Your blood pressure is higher than it should be or rises suddenly. · You have new nausea or vomiting. Watch closely for changes in your health, and be sure to contact your doctor if you have any problems. Additional Information:  Learning About Hypertensive Disorders After Childbirth    What is preeclampsia? A woman with preeclampsia has blood pressure that is higher than usual. She may also have other serious symptoms. Preeclampsia can be dangerous. When it is severe, it can cause seizures (eclampsia) or liver or kidney damage. When the liver is affected, some women get HELLP syndrome, a blood-clotting and bleeding problem. HELLP can come on quickly and can be deadly. This is why your doctor checks you and your baby often. Preeclampsia usually occurs after 20 weeks of pregnancy. In rare cases, it is first noted right after childbirth. Most often, it starts near the end of pregnancy and goes away after childbirth. What are the symptoms? Mild preeclampsia usually doesn't cause symptoms. But preeclampsia can cause rapid weight gain and sudden swelling of the hands and face. Severe preeclampsia does cause symptoms. It can cause a very bad headache and trouble seeing and breathing. It also can cause belly pain. You may also urinate less than usual.    If you have new preeclampsia symptoms after you go home from the hospital, call your doctor right away. What can you expect after you have had preeclampsia? In the hospital  After the baby and the placenta are delivered, preeclampsia usually starts to improve. Most women get better in the first few days after childbirth.   After having preeclampsia, you still have a risk of seizures for a day or more after childbirth. (Very rarely, seizures happen later on.) So your doctor may have you take magnesium sulfate for a day or more to prevent seizures. You may also take medicine to lower your blood pressure. When you go home  Your blood pressure will most likely return to normal a few days after delivery. Your doctor will want to check your blood pressure sometime in the first week after you leave the hospital.    Some women still have high blood pressure 6 weeks after childbirth. But most return to normal levels over the long term. · Take and record your blood pressure at home if your doctor tells you to. · Learn the importance of the two measures of blood pressure (such as 120 over 80, or 120/80). The first number is the systolic pressure. This is the force of blood on the artery walls as the heart pumps. The second number is the diastolic pressure. This is the force of blood on the artery walls between heartbeats, when the heart is at rest. You have a choice of monitors to use. Manual monitor: You pump up the cuff and use a stethoscope to listen for your  Pulse. · Electronic monitor: The cuff inflates, and a gauge shows your pulse rate. · To take your blood pressure:  · Ask your doctor to check your blood pressure monitor to be sure that it is accurate and that the cuff fits you. Also ask your doctor to watch you use it, to make sure that you are using it right. · You should not eat, use tobacco products, or use medicine known to raise blood pressure (such as some nasal decongestant sprays) before you take your blood pressure. · Avoid taking your blood pressure if you have just exercised or are nervous or upset. Rest at least 15 minutes before you take your blood pressure. · Be safe with medicines. If you take medicine, take it exactly as prescribed. Call your doctor if you think you are having a problem with your medicine.   · Do not smoke. Quitting smoking will help lower your blood pressure and improve your baby's growth and health. If you need help quitting, talk to your doctor about stop-smoking programs and medicines. These can increase your chances of quitting for good. · Eat a balanced and healthy diet that has lots of fruits and vegetables. Long-term health   After you have had preeclampsia, you have a higher-than-average risk of heart disease, stroke, and kidney disease. This may be because the same things that cause preeclampsia also cause heart and kidney disease. To protect your health, work with your doctor on living a heart-healthy lifestyle and getting the checkups you need. Your doctor may also want you to check your blood pressure at home. Follow-up care is a key part of your treatment and safety. Be sure to make and go to all appointments, and call your doctor if you are having problems. It's also a good idea to know your test results and keep a list of the medicines you take. Postpartum Support    PARENTS:  Are you feeling sad or depressed? Is it difficult for you to enjoy yourself? Do you feel more irritable or tense? Do you feel anxious or panicky? Are you having difficulty bonding with your baby? Do you feel as if you are \"out of control\" or \"going crazy\"? Are you worried that you might hurt your baby or yourself? FAMILIES: Do you worry that something is wrong but don't know how to help? Do you think that your partner or spouse is having problems coping? Are you worried that it may never get better? While many women experience some mild mood change or \"the blues\" during or after the birth of a child, 1 in 9 women experience more significant symptoms of depression or anxiety. 1 in 10 Dads become depressed during the first year. Things you can do  Being a good parent includes taking care of yourself. If you take care of yourself, you will be able to take better care of your baby and your family. · Talk to a counselor or healthcare provider who has training in  mood and anxiety problems. · Learn as much as you can about pregnancy and postpartum depression and anxiety. · Get support from family and friends. Ask for help when you need it. · Join a support group in your area or online. · Keep active by walking, stretching or whatever form of exercise helps you to feel better. · Get enough rest and time for yourself. · Eat a healthy diet. · Don't give up! It may take more than one try to get the right help you need. These are general instructions for a healthy lifestyle:    No smoking/ No tobacco products/ Avoid exposure to second hand smoke    Surgeon General's Warning:  Quitting smoking now greatly reduces serious risk to your health. Obesity, smoking, and sedentary lifestyle greatly increases your risk for illness    A healthy diet, regular physical exercise & weight monitoring are important for maintaining a healthy lifestyle    Recognize signs and symptoms of STROKE:    F-face looks uneven    A-arms unable to move or move unevenly    S-speech slurred or non-existent    T-time-call 911 as soon as signs and symptoms begin - DO NOT go       back to bed or wait to see if you get better - TIME IS BRAIN. I have had the opportunity to make my options or choices for discharge. I have received and understand these instructions. POST DELIVERY DISCHARGE INSTRUCTIONS    Name: Fabio Linton  YOB: 1995  Primary Diagnosis: Active Problems:    IUGR (intrauterine growth restriction) affecting care of mother (2020)      Gestational hypertension (2020)      S/P  section (2020)        General:     Diet/Diet Restrictions:  · Eight 8-ounce glasses of fluid daily (water, juices); avoid excessive caffeine intake. · Meals/snacks as desired which are high in fiber and carbohydrates and low in fat and cholesterol.           Physical Activity / Restrictions / Safety:     · Avoid heavy lifting, no more that 8 lbs. For 2-3 weeks;   · Limit use of stairs to 2 times daily for the first week home. · No driving for one week. · Avoid intercourse 4-6 weeks, no douching or tampon use. · Check with obstetrician before starting or resuming an exercise program.      Discharge Instructions/Special Treatment/Home Care Needs:     · Continue prenatal vitamins. · Continue to use squirt bottle with warm water on your episiotomy after each bathroom use until bleeding stops. · If steri-strips applied to your incision, remove in 7-10 days. Call your doctor for the following:     · Fever over 101 degrees by mouth. · Vaginal bleeding heavier than a normal menstrual period or clots larger than a golf ball. · Red streaks or increased swelling of legs, painful red streaks on your breast.  · Painful urination, constipation and increased pain or swelling or discharge with your incision. · If you feel extremely anxious or overwhelmed. · If you have thoughts of harming yourself and/or your baby. Pain Management:     · Take Acetaminophen (Tylenol) or Ibuprofen (Advil, Motrin), as directed for pain. · Use a warm Sitz bath 3 times daily to relieve episiotomy or hemorrhoidal discomfort. · For hemorrhoidal discomfort, use Tucks and Anusol cream as needed and directed. · Heating pad to  incision as needed. Follow-Up Care:     Appointment with MD:   Follow-up Appointments   Procedures    FOLLOW UP VISIT Appointment in: One Week     Standing Status:   Standing     Number of Occurrences:   1     Order Specific Question:   Appointment in     Answer: One Week     Telephone number: [phone]    Signed By: Denilson Barreto MD                                                                                                   Date: 2020 Time: 9:44 AM    Virtual postpartum support group meetings available at [email or web address]

## (undated) DEVICE — POOLE SUCTION INSTRUMENT WITH REMOVABLE SHEATH: Brand: POOLE

## (undated) DEVICE — Device: Brand: PORTEX

## (undated) DEVICE — SUTURE MCRYL SZ 0 L36IN ABSRB UD L36MM CT-1 1/2 CIR Y946H

## (undated) DEVICE — STERILE POLYISOPRENE POWDER-FREE SURGICAL GLOVES WITH EMOLLIENT COATING: Brand: PROTEXIS

## (undated) DEVICE — STERILE POLYISOPRENE POWDER-FREE SURGICAL GLOVES: Brand: PROTEXIS

## (undated) DEVICE — DRAPE FLD WRM W44XL66IN C6L FOR INTRATEMP SYS THERMABASIN

## (undated) DEVICE — SUTURE PLN GUT SZ 2-0 L27IN ABSRB YELLOWISH TAN L70MM XLH 53T

## (undated) DEVICE — 3000CC GUARDIAN II: Brand: GUARDIAN

## (undated) DEVICE — COVERALL PREM SMS 2XL KNIT --

## (undated) DEVICE — SUTURE VCRL SZ 0 L36IN ABSRB VLT L40MM CT 1/2 CIR J358H

## (undated) DEVICE — REM POLYHESIVE ADULT PATIENT RETURN ELECTRODE: Brand: VALLEYLAB

## (undated) DEVICE — TIP CLEANER: Brand: VALLEYLAB

## (undated) DEVICE — SUTURE VCRL SZ 2-0 L36IN ABSRB VLT L36MM CT-1 1/2 CIR J345H

## (undated) DEVICE — DEVON™ KNEE AND BODY STRAP 60" X 3" (1.5 M X 7.6 CM): Brand: DEVON

## (undated) DEVICE — (D)PREP SKN CHLRAPRP APPL 26ML -- CONVERT TO ITEM 371833

## (undated) DEVICE — KENDALL SCD EXPRESS SLEEVES, KNEE LENGTH, MEDIUM: Brand: KENDALL SCD

## (undated) DEVICE — LIGHT HANDLE: Brand: DEVON

## (undated) DEVICE — SUTURE MCRYL SZ 3-0 L27IN ABSRB UD L60MM KS STR REV CUT Y523H

## (undated) DEVICE — SOLUTION IRRIG 1000ML H2O STRL BLT

## (undated) DEVICE — ROYALSILK SURGICAL GOWN, L: Brand: CONVERTORS

## (undated) DEVICE — SOLUTION IV 1000ML 0.9% SOD CHL

## (undated) DEVICE — PACK PROCEDURE SURG C SECT KT SMH

## (undated) DEVICE — SOLIDIFIER MEDC 1200ML -- CONVERT TO 356117